# Patient Record
Sex: FEMALE | Race: NATIVE HAWAIIAN OR OTHER PACIFIC ISLANDER | Employment: FULL TIME | URBAN - METROPOLITAN AREA
[De-identification: names, ages, dates, MRNs, and addresses within clinical notes are randomized per-mention and may not be internally consistent; named-entity substitution may affect disease eponyms.]

---

## 2017-01-20 ENCOUNTER — HOSPITAL ENCOUNTER (OUTPATIENT)
Dept: ULTRASOUND IMAGING | Age: 32
Discharge: HOME | End: 2017-01-20
Attending: PHYSICIAN ASSISTANT | Admitting: PHYSICIAN ASSISTANT

## 2017-01-20 DIAGNOSIS — R10.11 RUQ PAIN: ICD-10-CM

## 2017-01-20 DIAGNOSIS — R10.11 RIGHT UPPER QUADRANT PAIN: ICD-10-CM

## 2017-01-24 ENCOUNTER — INITIAL CONSULT (OUTPATIENT)
Dept: SURGERY | Age: 32
End: 2017-01-24

## 2017-01-24 VITALS
SYSTOLIC BLOOD PRESSURE: 120 MMHG | HEIGHT: 63 IN | BODY MASS INDEX: 36.68 KG/M2 | WEIGHT: 207 LBS | DIASTOLIC BLOOD PRESSURE: 78 MMHG

## 2017-01-24 DIAGNOSIS — R22.9 SUBCUTANEOUS MASS: Primary | ICD-10-CM

## 2017-01-24 PROCEDURE — 99242 OFF/OP CONSLTJ NEW/EST SF 20: CPT | Performed by: SURGERY

## 2017-02-07 ENCOUNTER — OFFICE VISIT (OUTPATIENT)
Dept: SURGERY | Age: 32
End: 2017-02-07

## 2017-02-07 VITALS
HEIGHT: 64 IN | DIASTOLIC BLOOD PRESSURE: 76 MMHG | BODY MASS INDEX: 35.51 KG/M2 | SYSTOLIC BLOOD PRESSURE: 118 MMHG | WEIGHT: 208 LBS

## 2017-02-07 DIAGNOSIS — K80.20 GALLSTONES: Primary | ICD-10-CM

## 2017-02-07 PROCEDURE — 99214 OFFICE O/P EST MOD 30 MIN: CPT | Performed by: SURGERY

## 2017-02-16 ENCOUNTER — HOSPITAL ENCOUNTER (OUTPATIENT)
Dept: SURGERY | Age: 32
Discharge: HOME | End: 2017-02-16
Attending: SURGERY | Admitting: SURGERY

## 2017-02-16 VITALS
HEART RATE: 85 BPM | DIASTOLIC BLOOD PRESSURE: 80 MMHG | BODY MASS INDEX: 35.51 KG/M2 | RESPIRATION RATE: 16 BRPM | WEIGHT: 208 LBS | SYSTOLIC BLOOD PRESSURE: 126 MMHG | TEMPERATURE: 97.4 F | HEIGHT: 64 IN | OXYGEN SATURATION: 93 %

## 2017-02-16 DIAGNOSIS — K80.20 GALL STONES: ICD-10-CM

## 2017-02-16 LAB
AMYLASE: 88 U/L (ref 25–115)
ANION GAP SERPL CALCULATED.3IONS-SCNC: 11 MMOL/L (ref 3–16)
BUN BLDV-MCNC: 11 MG/DL (ref 7–20)
CALCIUM SERPL-MCNC: 9.1 MG/DL (ref 8.3–10.6)
CHLORIDE BLD-SCNC: 101 MMOL/L (ref 99–110)
CO2: 25 MMOL/L (ref 21–32)
CREAT SERPL-MCNC: 0.6 MG/DL (ref 0.6–1.1)
GFR AFRICAN AMERICAN: >60
GFR NON-AFRICAN AMERICAN: >60
GLUCOSE BLD-MCNC: 100 MG/DL (ref 70–99)
POTASSIUM SERPL-SCNC: 4 MMOL/L (ref 3.5–5.1)
PREGNANCY, URINE: NEGATIVE
SODIUM BLD-SCNC: 137 MMOL/L (ref 136–145)

## 2017-02-16 PROCEDURE — 47563 LAPARO CHOLECYSTECTOMY/GRAPH: CPT | Performed by: SURGERY

## 2017-02-16 RX ORDER — SODIUM CHLORIDE 0.9 % (FLUSH) 0.9 %
10 SYRINGE (ML) INJECTION EVERY 12 HOURS SCHEDULED
Status: DISCONTINUED | OUTPATIENT
Start: 2017-02-16 | End: 2017-02-17 | Stop reason: HOSPADM

## 2017-02-16 RX ORDER — MORPHINE SULFATE 4 MG/ML
2 INJECTION, SOLUTION INTRAMUSCULAR; INTRAVENOUS EVERY 5 MIN PRN
Status: DISCONTINUED | OUTPATIENT
Start: 2017-02-16 | End: 2017-02-17 | Stop reason: HOSPADM

## 2017-02-16 RX ORDER — DIPHENHYDRAMINE HYDROCHLORIDE 50 MG/ML
12.5 INJECTION INTRAMUSCULAR; INTRAVENOUS
Status: ACTIVE | OUTPATIENT
Start: 2017-02-16 | End: 2017-02-16

## 2017-02-16 RX ORDER — LIDOCAINE HYDROCHLORIDE 10 MG/ML
1 INJECTION, SOLUTION EPIDURAL; INFILTRATION; INTRACAUDAL; PERINEURAL
Status: COMPLETED | OUTPATIENT
Start: 2017-02-16 | End: 2017-02-16

## 2017-02-16 RX ORDER — OXYCODONE HYDROCHLORIDE 5 MG/1
5-10 TABLET ORAL EVERY 4 HOURS PRN
Qty: 40 TABLET | Refills: 0 | Status: SHIPPED | OUTPATIENT
Start: 2017-02-16 | End: 2017-03-02 | Stop reason: ALTCHOICE

## 2017-02-16 RX ORDER — MEPERIDINE HYDROCHLORIDE 25 MG/ML
12.5 INJECTION INTRAMUSCULAR; INTRAVENOUS; SUBCUTANEOUS EVERY 5 MIN PRN
Status: DISCONTINUED | OUTPATIENT
Start: 2017-02-16 | End: 2017-02-17 | Stop reason: HOSPADM

## 2017-02-16 RX ORDER — LABETALOL HYDROCHLORIDE 5 MG/ML
5 INJECTION, SOLUTION INTRAVENOUS EVERY 10 MIN PRN
Status: DISCONTINUED | OUTPATIENT
Start: 2017-02-16 | End: 2017-02-17 | Stop reason: HOSPADM

## 2017-02-16 RX ORDER — ONDANSETRON 2 MG/ML
4 INJECTION INTRAMUSCULAR; INTRAVENOUS
Status: COMPLETED | OUTPATIENT
Start: 2017-02-16 | End: 2017-02-16

## 2017-02-16 RX ORDER — MORPHINE SULFATE 4 MG/ML
1 INJECTION, SOLUTION INTRAMUSCULAR; INTRAVENOUS EVERY 5 MIN PRN
Status: DISCONTINUED | OUTPATIENT
Start: 2017-02-16 | End: 2017-02-17 | Stop reason: HOSPADM

## 2017-02-16 RX ORDER — HYDROMORPHONE HCL 110MG/55ML
0.25 PATIENT CONTROLLED ANALGESIA SYRINGE INTRAVENOUS EVERY 5 MIN PRN
Status: DISCONTINUED | OUTPATIENT
Start: 2017-02-16 | End: 2017-02-17 | Stop reason: HOSPADM

## 2017-02-16 RX ORDER — HYDRALAZINE HYDROCHLORIDE 20 MG/ML
5 INJECTION INTRAMUSCULAR; INTRAVENOUS
Status: DISCONTINUED | OUTPATIENT
Start: 2017-02-16 | End: 2017-02-17 | Stop reason: HOSPADM

## 2017-02-16 RX ORDER — HEPARIN SODIUM 5000 [USP'U]/ML
5000 INJECTION, SOLUTION INTRAVENOUS; SUBCUTANEOUS ONCE
Status: COMPLETED | OUTPATIENT
Start: 2017-02-16 | End: 2017-02-16

## 2017-02-16 RX ORDER — HYDROMORPHONE HCL 110MG/55ML
0.5 PATIENT CONTROLLED ANALGESIA SYRINGE INTRAVENOUS EVERY 5 MIN PRN
Status: DISCONTINUED | OUTPATIENT
Start: 2017-02-16 | End: 2017-02-17 | Stop reason: HOSPADM

## 2017-02-16 RX ORDER — SODIUM CHLORIDE 0.9 % (FLUSH) 0.9 %
10 SYRINGE (ML) INJECTION PRN
Status: DISCONTINUED | OUTPATIENT
Start: 2017-02-16 | End: 2017-02-17 | Stop reason: HOSPADM

## 2017-02-16 RX ORDER — SODIUM CHLORIDE, SODIUM LACTATE, POTASSIUM CHLORIDE, CALCIUM CHLORIDE 600; 310; 30; 20 MG/100ML; MG/100ML; MG/100ML; MG/100ML
INJECTION, SOLUTION INTRAVENOUS CONTINUOUS
Status: DISCONTINUED | OUTPATIENT
Start: 2017-02-16 | End: 2017-02-17 | Stop reason: HOSPADM

## 2017-02-16 RX ORDER — OXYCODONE HYDROCHLORIDE AND ACETAMINOPHEN 5; 325 MG/1; MG/1
2 TABLET ORAL PRN
Status: COMPLETED | OUTPATIENT
Start: 2017-02-16 | End: 2017-02-16

## 2017-02-16 RX ORDER — OXYCODONE HYDROCHLORIDE AND ACETAMINOPHEN 5; 325 MG/1; MG/1
1 TABLET ORAL PRN
Status: COMPLETED | OUTPATIENT
Start: 2017-02-16 | End: 2017-02-16

## 2017-02-16 RX ADMIN — HEPARIN SODIUM 5000 UNITS: 5000 INJECTION, SOLUTION INTRAVENOUS; SUBCUTANEOUS at 11:20

## 2017-02-16 RX ADMIN — SODIUM CHLORIDE, SODIUM LACTATE, POTASSIUM CHLORIDE, CALCIUM CHLORIDE: 600; 310; 30; 20 INJECTION, SOLUTION INTRAVENOUS at 11:21

## 2017-02-16 RX ADMIN — Medication 0.5 MG: at 13:06

## 2017-02-16 RX ADMIN — Medication 0.5 MG: at 12:55

## 2017-02-16 RX ADMIN — LIDOCAINE HYDROCHLORIDE 1 ML: 10 INJECTION, SOLUTION EPIDURAL; INFILTRATION; INTRACAUDAL; PERINEURAL at 11:24

## 2017-02-16 RX ADMIN — ONDANSETRON 4 MG: 2 INJECTION INTRAMUSCULAR; INTRAVENOUS at 12:55

## 2017-02-16 RX ADMIN — OXYCODONE HYDROCHLORIDE AND ACETAMINOPHEN 1 TABLET: 5; 325 TABLET ORAL at 13:32

## 2017-02-16 RX ADMIN — Medication 0.5 MG: at 13:15

## 2017-02-16 ASSESSMENT — PAIN SCALES - GENERAL
PAINLEVEL_OUTOF10: 10
PAINLEVEL_OUTOF10: 4
PAINLEVEL_OUTOF10: 8
PAINLEVEL_OUTOF10: 7

## 2017-02-22 ENCOUNTER — TELEPHONE (OUTPATIENT)
Dept: SURGERY | Age: 32
End: 2017-02-22

## 2017-03-02 ENCOUNTER — OFFICE VISIT (OUTPATIENT)
Dept: SURGERY | Age: 32
End: 2017-03-02

## 2017-03-02 VITALS
DIASTOLIC BLOOD PRESSURE: 78 MMHG | WEIGHT: 203 LBS | BODY MASS INDEX: 34.66 KG/M2 | HEIGHT: 64 IN | SYSTOLIC BLOOD PRESSURE: 124 MMHG

## 2017-03-02 DIAGNOSIS — Z98.890 POST-OPERATIVE STATE: Primary | ICD-10-CM

## 2017-03-02 PROCEDURE — 99024 POSTOP FOLLOW-UP VISIT: CPT | Performed by: SURGERY

## 2017-10-26 ENCOUNTER — OFFICE VISIT (OUTPATIENT)
Dept: CARDIOLOGY CLINIC | Age: 32
End: 2017-10-26

## 2017-10-26 VITALS
DIASTOLIC BLOOD PRESSURE: 70 MMHG | HEART RATE: 88 BPM | HEIGHT: 63 IN | WEIGHT: 185 LBS | SYSTOLIC BLOOD PRESSURE: 120 MMHG | BODY MASS INDEX: 32.78 KG/M2

## 2017-10-26 DIAGNOSIS — R00.2 PALPITATION: ICD-10-CM

## 2017-10-26 DIAGNOSIS — R07.89 ATYPICAL CHEST PAIN: Primary | ICD-10-CM

## 2017-10-26 DIAGNOSIS — R06.02 SOB (SHORTNESS OF BREATH): ICD-10-CM

## 2017-10-26 PROBLEM — R07.2 PRECORDIAL PAIN: Status: ACTIVE | Noted: 2017-10-26

## 2017-10-26 PROCEDURE — 99204 OFFICE O/P NEW MOD 45 MIN: CPT | Performed by: INTERNAL MEDICINE

## 2017-10-26 NOTE — COMMUNICATION BODY
Assessment:  1. Atypical chest pain      2. SOB (shortness of breath)      3. Palpitation         Recommendation:  - She has low pretest probability for CAD and will risk stratify her with stress echocardiogram. For her shortness of breath will obtain echocardiogram to rule out structural heart disease. Her palpitations may be related to anxiety vs tachyarrhythmias and will obtain 30 day event monitor.  - She will follow up based on test results.

## 2017-11-08 ENCOUNTER — HOSPITAL ENCOUNTER (OUTPATIENT)
Dept: CARDIOLOGY | Facility: CLINIC | Age: 32
Discharge: HOME | End: 2017-11-08
Attending: INTERNAL MEDICINE | Admitting: INTERNAL MEDICINE

## 2017-11-13 ENCOUNTER — TELEPHONE (OUTPATIENT)
Dept: CARDIOLOGY CLINIC | Age: 32
End: 2017-11-13

## 2017-11-13 NOTE — TELEPHONE ENCOUNTER
Spoke with April, relayed echo and stress echo results per Dr. Aidee Kam. Pt verbalized understanding.

## 2017-12-12 ENCOUNTER — TELEPHONE (OUTPATIENT)
Dept: CARDIOLOGY CLINIC | Age: 32
End: 2017-12-12

## 2017-12-13 PROCEDURE — 93272 ECG/REVIEW INTERPRET ONLY: CPT | Performed by: INTERNAL MEDICINE

## 2017-12-14 DIAGNOSIS — R07.89 ATYPICAL CHEST PAIN: ICD-10-CM

## 2017-12-14 DIAGNOSIS — R00.2 PALPITATION: ICD-10-CM

## 2017-12-14 DIAGNOSIS — R06.02 SOB (SHORTNESS OF BREATH): ICD-10-CM

## 2017-12-14 NOTE — TELEPHONE ENCOUNTER
Left message to call for monitor results. Abnormal.  He would like to know if patient is willing to start a beta blocker. Report scanned in.

## 2017-12-19 ENCOUNTER — TELEPHONE (OUTPATIENT)
Dept: CARDIOLOGY CLINIC | Age: 32
End: 2017-12-19

## 2017-12-19 NOTE — TELEPHONE ENCOUNTER
Per pt HIPA form can leave detailed message on machine. [facility] relaying [facility] message. Pt to call the office if she has concerns.

## 2017-12-22 ENCOUNTER — TELEPHONE (OUTPATIENT)
Dept: CARDIOLOGY CLINIC | Age: 32
End: 2017-12-22

## 2017-12-27 ENCOUNTER — TELEPHONE (OUTPATIENT)
Dept: CARDIOLOGY CLINIC | Age: 32
End: 2017-12-27

## 2017-12-27 NOTE — TELEPHONE ENCOUNTER
Pt sts her HR is low running low 70's and her B/P is 142/94. Every now and then getting pressure in her head, feels nauseus and pressure in chest. This started this AM. Please advise. Last OV 11/8/2017   labs 10/25/2017   echo 11/08/2017   See order for event monitor from 12/14/2017,but order still active.

## 2017-12-28 ENCOUNTER — OFFICE VISIT (OUTPATIENT)
Dept: CARDIOLOGY CLINIC | Age: 32
End: 2017-12-28

## 2017-12-28 ENCOUNTER — HOSPITAL ENCOUNTER (OUTPATIENT)
Dept: OTHER | Age: 32
Discharge: HOME | End: 2017-12-28
Attending: EMERGENCY MEDICINE | Admitting: EMERGENCY MEDICINE

## 2017-12-28 VITALS
WEIGHT: 174 LBS | SYSTOLIC BLOOD PRESSURE: 98 MMHG | OXYGEN SATURATION: 98 % | DIASTOLIC BLOOD PRESSURE: 60 MMHG | BODY MASS INDEX: 30.83 KG/M2 | HEART RATE: 71 BPM | HEIGHT: 63 IN

## 2017-12-28 DIAGNOSIS — I49.3 SYMPTOMATIC PVCS: Primary | ICD-10-CM

## 2017-12-28 DIAGNOSIS — I49.1 PAC (PREMATURE ATRIAL CONTRACTION): ICD-10-CM

## 2017-12-28 DIAGNOSIS — R00.0 SINUS TACHYCARDIA: ICD-10-CM

## 2017-12-28 PROCEDURE — 99213 OFFICE O/P EST LOW 20 MIN: CPT | Performed by: INTERNAL MEDICINE

## 2017-12-28 RX ORDER — MAGNESIUM OXIDE 400 MG/1
400 TABLET ORAL DAILY
Qty: 30 TABLET | Refills: 1 | Status: SHIPPED | OUTPATIENT
Start: 2017-12-28 | End: 2018-02-12 | Stop reason: ALTCHOICE

## 2017-12-28 NOTE — PATIENT INSTRUCTIONS
Recommendation:  - She was started on Lopressor for the ectopy noted on event monitor. Her ectopy and tachycardia is better on the Lopressor. She is taking half the dose as the full 25 mg dose was making her dizzy. I will also add Mg oxide 400 mg daily for the PVCs. Her stress echo was normal and echocardiogram showed no structural heart disease. - I will see her back in 12 months.

## 2017-12-28 NOTE — LETTER
[address] [place] [address] Cardiology - [address] [place] [id number]  Phone: [phone]  Fax: [phone]    Susan Carrasco MD        December 28, 2017     Frantz Rodríguez NP  [address] [facility] [address]    Patient: Essence Jackson  MR Number: [id number]  YOB: 1985  Date of Visit: 12/28/2017    Dear Dr. Frantz Rodríguez: Thank you for allowing me to participate in the care of Essence Roca. Below are the relevant portions of my assessment and plan of care. Assessment:  1. Symptomatic PACs & PVCs     2. Symptomatic sinus tachcyardia     3. Palpitation         Recommendation:  - She was started on Lopressor for the ectopy noted on event monitor. Her ectopy and tachycardia is better on the Lopressor. She is taking half the dose as the full 25 mg dose was making her dizzy. I will also add Mg oxide 400 mg daily for the PVCs. Her stress echo was normal and echocardiogram showed no structural heart disease. - I will see her back in 12 months. If you have questions, please do not hesitate to call me. I look forward to following April along with you.     Sincerely,        Susan Carrasco MD

## 2017-12-28 NOTE — PROGRESS NOTES
Section of Cardiology                                     Cardiovascular Evaluation      PATIENT: Essence Riggs  DATE: 2017  MRN: [id number]  CSN: [id number]  : 1985    Primary Care Doctor: Osiel Moctezuma NP    Reason for evaluation:   Chest Pain (pressure, sob, pressure in head, tingling face for several months)    History of present illness:  Essence Ruiz is a 34 y.o. patient who presents with palpitations, chest pain and shortness of breath. She reports her chest pain is in the center with radiation across her chest. She has radiation into her left shoulder and axillary. She reports \"her heart fells like its collapsing on her. \" She reports the episodes are with and without activity. She started on antianxiety meds that have not really helped. She reports the pain lasts up to 2 hours. If the pain is more than 2 hours, she goes to the ER. She reports she has episodes of shortness of breath with the chest pain. She reports she has \"racing heart beat\" at onset of chest pain. She reports her Fit Bit showed her heart rate in the 130s. She reports the palpitations are increasing in frequency. Occasionally she has the palpitations unrelated to chest pain. She has increased stress at her job. Today she presents for follow up of palpitations, chest pain SOB. Since last visit, she wore a cardiac event monitor 10/26-17 which showed PVC's, PAC and ST. She underwent stress test 17 which was normal. She was started on Lopressor. This has helped with her palpitations. Denied chest pain, shortness of breath, edema, dizziness and syncope. She presented to the ED last night for left leg pain. Doppler today was negative for DVT. Past Medical History:   has a past medical history of Anxiety; Asthma; Ectopic pregnancy; and Heart abnormalities.     Surgical History:   has a past surgical history that includes [place] tooth extraction; Colonoscopy (14); and Cholecystectomy normal       Neck: Supple, symmetrical, trachea midline, no adenopathy, thyroid: not enlarged, symmetric, no tenderness/mass/nodules, no carotid bruit or JVD       Lungs:   Clear to auscultation bilaterally, respirations unlabored   Chest Wall:  No tenderness or deformity       Heart:  Regular rhythm and normal rate; S1, S2 are normal; no murmur noted; no rub or gallop       Abdomen:   Soft, non-tender, bowel sounds active all four quadrants,  no masses, no organomegaly       Extremities: No cyanosis or edema. No deformity of hands or feet   Pulses: 2+ and symmetric       Skin: Skin color, texture, turgor normal. No rashes or lesions       Pysch: Normal mood and affect.  Alert, oriented x 3       Neurologic: Normal gross motor and sensory exam.       DIAGNOSTIC WORK UP:  Lab Data:  CBC:   Lab Results   Component Value Date    WBC 12.1 12/27/2017    HGB 14.0 12/27/2017    HCT 41.4 12/27/2017    MCV 90.2 12/27/2017     12/27/2017     BMP:   Lab Results   Component Value Date     12/27/2017    K 3.5 12/27/2017     12/27/2017    CO2 22 12/27/2017    PHOS 3.1 02/28/2012    BUN 11 12/27/2017    CREATININE <0.5 12/27/2017    CALCIUM 9.5 12/27/2017    GFRAA >60 12/27/2017    GFRAA >60 02/28/2012    MG 1.8 02/28/2012     LFTS:   Lab Results   Component Value Date    ALT 15 12/27/2017    AST 11 12/27/2017    ALKPHOS 66 12/27/2017    PROT 6.7 12/27/2017    PROT 6.7 01/26/2012    AGRATIO 1.5 12/27/2017    BILITOT <0.2 12/27/2017     PT/INR: No results found for: PTINR  LIPID PANEL: No components found for: CHLPL  No results found for: TRIG  No results found for: HDL  No results found for: LDLCALC  TSH:   Lab Results   Component Value Date    TSH 3.13 06/07/2017     FREET4: No results found for: Hiral Edelson: No components found for: FREET3  BNP: No results found for: BNP    Stress/Echo 11/8/17  Echo   Baseline resting echocardiogram shows normal global LV systolic function   with an ejection fraction of 55%

## 2018-01-09 ENCOUNTER — HOSPITAL ENCOUNTER (OUTPATIENT)
Dept: NEUROLOGY | Age: 33
Discharge: HOME | End: 2018-01-09
Attending: NURSE PRACTITIONER | Admitting: NURSE PRACTITIONER

## 2018-01-09 NOTE — PROCEDURES
and Nerve Conduction Findings: The above EMG and nerve conduction studies were within normal limits. Overall Impression: Normal study without evidence of an acute radiculopathy, entrapment neuropathy or other lower motor neuron dysfunction. Thank you    Electronically signed by:  Jennifer Yates DO,1/9/2018,2:56 PM

## 2018-01-24 ENCOUNTER — OFFICE VISIT (OUTPATIENT)
Dept: ORTHOPEDIC SURGERY | Age: 33
End: 2018-01-24

## 2018-01-24 VITALS
HEIGHT: 63 IN | HEART RATE: 81 BPM | SYSTOLIC BLOOD PRESSURE: 117 MMHG | WEIGHT: 173.94 LBS | BODY MASS INDEX: 30.82 KG/M2 | DIASTOLIC BLOOD PRESSURE: 81 MMHG

## 2018-01-24 DIAGNOSIS — M54.12 CERVICAL RADICULITIS: Primary | ICD-10-CM

## 2018-01-24 DIAGNOSIS — M50.30 DDD (DEGENERATIVE DISC DISEASE), CERVICAL: ICD-10-CM

## 2018-01-24 PROCEDURE — 99204 OFFICE O/P NEW MOD 45 MIN: CPT | Performed by: PHYSICIAN ASSISTANT

## 2018-01-24 NOTE — PROGRESS NOTES
Denies skin changes, delayed healing, rash, itching   HEMATOLOGIC: Denies easy bleeding or bruising  ENDOCRINE: Denies excessive thirst, urination, heat/cold  RESPIRATORY: Denies current dyspnea, cough  GI: Denies nausea, vomiting, diarrhea   : Denies bowel or bladder issues       PHYSICAL EXAM:    Vitals: Blood pressure 117/81, pulse 81, height 5' 2.99\" (1.6 m), weight 173 lb 15.1 oz (78.9 kg), last menstrual period 12/20/2017, not currently breastfeeding. GENERAL EXAM:  · General Apparence: Patient is adequately groomed with no evidence of malnutrition. · Orientation: The patient is oriented to time, place and person. · Mood & Affect:The patient's mood and affect are appropriate   · Vascular: Examination reveals no swelling tenderness in upper or lower extremities. Good capillary refill  · Lymphatic: The lymphatic examination bilaterally reveals all areas to be without enlargement or induration  · Sensation: Sensation is intact without deficit  · Coordination/Balance: Good coordination     CERVICAL EXAMINATION:  · Inspection: Local inspection shows no step-off or bruising. Cervical alignment is normal.     · Palpation: No evidence of tenderness at the midline. Mild tenderness left trap. There is no step-off or paraspinal spasm. · Range of Motion: Intact flexion mild loss of extension  · Strength: 5/5 bilateral upper extremities   · Special Tests:    ·   Spurling's causes some pain extending into the left trap, no distal pain, L'Hermitte's & Huang's negative bilaterally. ·   Grier and Impingement tests are negative bilaterally. ·  Cubital and Carpal tunnel Tinel's negative bilaterally. · Skin:There are no rashes, ulcerations or lesions in right & left upper extremities. · Reflexes: Bilaterally triceps, biceps and brachioradialis are 2+. Clonus absent bilaterally at the feet.     · Additional Examinations:       · RIGHT UPPER EXTREMITY:  Inspection/examination of the right upper extremity

## 2018-02-22 ENCOUNTER — OFFICE VISIT (OUTPATIENT)
Dept: ORTHOPEDIC SURGERY | Age: 33
End: 2018-02-22

## 2018-02-22 VITALS
HEIGHT: 63 IN | BODY MASS INDEX: 30.47 KG/M2 | DIASTOLIC BLOOD PRESSURE: 91 MMHG | WEIGHT: 171.96 LBS | SYSTOLIC BLOOD PRESSURE: 130 MMHG | HEART RATE: 86 BPM

## 2018-02-22 DIAGNOSIS — M54.12 CERVICAL RADICULITIS: Primary | ICD-10-CM

## 2018-02-22 DIAGNOSIS — M50.30 DDD (DEGENERATIVE DISC DISEASE), CERVICAL: ICD-10-CM

## 2018-02-22 PROCEDURE — 99214 OFFICE O/P EST MOD 30 MIN: CPT | Performed by: PHYSICIAN ASSISTANT

## 2018-02-22 NOTE — PROGRESS NOTES
Follow up: SP[facility]    CHIEF COMPLAINT:    Chief Complaint   Patient presents with    Neck Pain     cervical       HISTORY OF PRESENT ILLNESS:                The patient is a 34 y.o. female here to follow up for cervical MRI review for a 7 month history of aching neck pain with intermittent numbness extending into the left shoulder (no longer below). Symptoms are increased with any prolonged cervical ROM and activity. Some relief with stretching. History mild numbness below her left orbit she is pending neurology consult. Previous negative cardiac evaluation. Conservative care includes chiropractics, NSAIDs. Pending physical therapy. She denies any upper extremity weakness, no fine motor difficulty or gait instability. No diplopia or dysphasia. No lower extremity symptoms. No bowel or bladder dysfunction. Current/Past Treatment:   · Physical Therapy: HEP  · Chiropractic:   YES  · Injection:   no  · Medications: NSAIDs   · Surgery/Consult: no     Work Status/Functionality: IT      Past Medical History: Medical history form was reviewed today and scanned into the media tab. Past Medical History:   Diagnosis Date    Anxiety     Asthma     Ectopic pregnancy     Heart abnormalities     heart palpitations in early pregnancy        REVIEW OF SYSTEMS:   CONSTITUTIONAL: Denies unexplained weight loss, fevers, chills or fatigue  NEUROLOGIC: Denies tremors or seizures         PHYSICAL EXAM:    Vitals: Blood pressure (!) 130/91, pulse 86, height 5' 2.99\" (1.6 m), weight 171 lb 15.3 oz (78 kg), last menstrual period 02/08/2018, not currently breastfeeding. GENERAL EXAM:  · General Apparence: Patient is adequately groomed with no evidence of malnutrition. · Orientation: The patient is oriented to time, place and person.    · Mood & Affect:The patient's mood and affect are appropriate    · Lymphatic: The lymphatic examination bilaterally reveals all areas to be without enlargement or induration  · Sensation:

## 2018-02-28 ENCOUNTER — HOSPITAL ENCOUNTER (OUTPATIENT)
Dept: PHYSICAL THERAPY | Age: 33
Discharge: HOME | End: 2018-02-28
Admitting: ORTHOPAEDIC SURGERY

## 2018-03-01 ENCOUNTER — HOSPITAL ENCOUNTER (OUTPATIENT)
Dept: PHYSICAL THERAPY | Age: 33
Discharge: HOME | End: 2018-03-31
Attending: ORTHOPAEDIC SURGERY | Admitting: ORTHOPAEDIC SURGERY

## 2018-06-18 ENCOUNTER — HOSPITAL ENCOUNTER (OUTPATIENT)
Dept: OTHER | Age: 33
Discharge: HOME | End: 2018-06-18
Attending: INTERNAL MEDICINE | Admitting: INTERNAL MEDICINE

## 2018-06-19 LAB
T3 FREE: 2.5 PG/ML (ref 2.3–4.2)
T4 FREE: 1.2 NG/DL (ref 0.9–1.8)
TSH SERPL DL<=0.05 MIU/L-ACNC: 1.9 UIU/ML (ref 0.27–4.2)

## 2018-09-27 ENCOUNTER — HOSPITAL ENCOUNTER (EMERGENCY)
Age: 33
Discharge: HOME | End: 2018-09-27
Payer: COMMERCIAL

## 2018-09-27 VITALS
BODY MASS INDEX: 28.35 KG/M2 | TEMPERATURE: 98.3 F | HEIGHT: 63 IN | WEIGHT: 160 LBS | OXYGEN SATURATION: 100 % | HEART RATE: 88 BPM | SYSTOLIC BLOOD PRESSURE: 128 MMHG | DIASTOLIC BLOOD PRESSURE: 78 MMHG | RESPIRATION RATE: 14 BRPM

## 2018-09-27 DIAGNOSIS — B09 VIRAL EXANTHEM: Primary | ICD-10-CM

## 2018-09-27 LAB — S PYO AG THROAT QL: NEGATIVE

## 2018-09-27 PROCEDURE — 87880 STREP A ASSAY W/OPTIC: CPT

## 2018-09-27 PROCEDURE — 6370000000 HC RX 637 (ALT 250 FOR IP): Performed by: PHYSICIAN ASSISTANT

## 2018-09-27 PROCEDURE — 99282 EMERGENCY DEPT VISIT SF MDM: CPT

## 2018-09-27 PROCEDURE — 87081 CULTURE SCREEN ONLY: CPT

## 2018-09-27 RX ORDER — PREDNISONE 20 MG/1
60 TABLET ORAL ONCE
Status: COMPLETED | OUTPATIENT
Start: 2018-09-27 | End: 2018-09-27

## 2018-09-27 RX ORDER — PREDNISONE 10 MG/1
60 TABLET ORAL DAILY
Qty: 30 TABLET | Refills: 0 | Status: SHIPPED | OUTPATIENT
Start: 2018-09-27 | End: 2018-10-02

## 2018-09-27 RX ORDER — DIPHENHYDRAMINE HCL 25 MG
25 CAPSULE ORAL EVERY 4 HOURS PRN
Qty: 25 CAPSULE | Refills: 0 | Status: SHIPPED | OUTPATIENT
Start: 2018-09-27 | End: 2018-10-07

## 2018-09-27 RX ORDER — DIPHENHYDRAMINE HCL 25 MG
25 TABLET ORAL ONCE
Status: COMPLETED | OUTPATIENT
Start: 2018-09-27 | End: 2018-09-27

## 2018-09-27 RX ADMIN — PREDNISONE 60 MG: 20 TABLET ORAL at 10:59

## 2018-09-27 RX ADMIN — DIPHENHYDRAMINE HCL 25 MG: 25 TABLET ORAL at 10:59

## 2018-09-27 ASSESSMENT — PAIN DESCRIPTION - LOCATION: LOCATION: GENERALIZED

## 2018-09-27 ASSESSMENT — PAIN DESCRIPTION - PAIN TYPE: TYPE: ACUTE PAIN

## 2018-09-27 ASSESSMENT — PAIN SCALES - GENERAL: PAINLEVEL_OUTOF10: 2

## 2018-09-27 ASSESSMENT — PAIN DESCRIPTION - DESCRIPTORS: DESCRIPTORS: ACHING

## 2018-09-27 NOTE — ED PROVIDER NOTES
[facility]  e[facility]GENCY dEPARTMENT eNCOUnter      Pt Name: April Ronni Schirmer  MRN: [id number]  Ramirogfalejandra 1985  Date of evaluation: 9/27/2018  Provider: KHLOE Mendoza      Chief Complaint:    Chief Complaint   Patient presents with    Rash     scattered rash all over that started yesterday. Pt denies itching but states \"it feels like it burns some times, like really dry skin\". Unknown cause, denies new medications/soaps/lotion       Nursing Notes, Past Medical Hx, Past Surgical Hx, Social Hx, Allergies, and Family Hx were all reviewed and agreed with or any disagreements were addressed in the HPI.    HPI:  (Location, Duration, Timing, Severity, Quality, Assoc Sx, Context, Modifying factors)  This is a  34 y.o. female who presents here to the emergency department, she states that she began getting a rash on her arms and legs last night. She also states that she has some mild body aching in her hands and feet were a little swollen this morning. She denies any vaginal discharge, increased frequency or urgency of urination, fevers or chills, chest pain or shortness of breath. She did buy some new clothes yesterday, but otherwise has no new detergents, lotions or soaps or new medicines or new products. She denies any exposure to strep throat or sore throat at this time. She rates her discomfort level is 2/10.     Past Medical/Surgical History:      Diagnosis Date    Anxiety     Asthma     Ectopic pregnancy     Heart abnormalities     heart palpitations in early pregnancy         Procedure Laterality Date    CHOLECYSTECTOMY  02/16/2017    LAPAROSCOPIC CHOLECYSTECTOMY WITH CHOLANGIOGRAMS    COLONOSCOPY  5/19/14    normal    WISDOM TOOTH EXTRACTION      3 yrs ago       Medications:  Previous Medications    METOPROLOL TARTRATE (LOPRESSOR) 25 MG TABLET    Take 0.5 tablets by mouth 2 times daily         Review of Systems:  Review of

## 2018-10-02 LAB — S PYO THROAT QL CULT: NORMAL

## 2018-10-10 ENCOUNTER — APPOINTMENT (OUTPATIENT)
Dept: GENERAL RADIOLOGY | Age: 33
End: 2018-10-10
Payer: COMMERCIAL

## 2018-10-10 ENCOUNTER — HOSPITAL ENCOUNTER (EMERGENCY)
Age: 33
Discharge: HOME | End: 2018-10-11
Payer: COMMERCIAL

## 2018-10-10 DIAGNOSIS — M54.2 NECK PAIN: Primary | ICD-10-CM

## 2018-10-10 DIAGNOSIS — M79.601 PAIN OF RIGHT UPPER EXTREMITY: ICD-10-CM

## 2018-10-10 LAB
A/G RATIO: 1.5 (ref 1.1–2.2)
ALBUMIN SERPL-MCNC: 3.9 G/DL (ref 3.4–5)
ALP BLD-CCNC: 52 U/L (ref 40–129)
ALT SERPL-CCNC: 24 U/L (ref 10–40)
ANION GAP SERPL CALCULATED.3IONS-SCNC: 11 MMOL/L (ref 3–16)
AST SERPL-CCNC: 13 U/L (ref 15–37)
BILIRUB SERPL-MCNC: <0.2 MG/DL (ref 0–1)
BILIRUBIN URINE: NEGATIVE
BLOOD, URINE: NEGATIVE
BUN BLDV-MCNC: 18 MG/DL (ref 7–20)
CALCIUM SERPL-MCNC: 9.2 MG/DL (ref 8.3–10.6)
CHLORIDE BLD-SCNC: 104 MMOL/L (ref 99–110)
CLARITY: CLEAR
CO2: 23 MMOL/L (ref 21–32)
COLOR: YELLOW
CREAT SERPL-MCNC: 0.7 MG/DL (ref 0.6–1.1)
GFR AFRICAN AMERICAN: >60
GFR NON-AFRICAN AMERICAN: >60
GLOBULIN: 2.6 G/DL
GLUCOSE BLD-MCNC: 106 MG/DL (ref 70–99)
GLUCOSE URINE: NEGATIVE MG/DL
HCG(URINE) PREGNANCY TEST: NEGATIVE
HCT VFR BLD CALC: 42.4 % (ref 36–48)
HEMOGLOBIN: 14.4 G/DL (ref 12–16)
KETONES, URINE: NEGATIVE MG/DL
LEUKOCYTE ESTERASE, URINE: NEGATIVE
MCH RBC QN AUTO: 31.7 PG (ref 26–34)
MCHC RBC AUTO-ENTMCNC: 34 G/DL (ref 31–36)
MCV RBC AUTO: 93.3 FL (ref 80–100)
MICROSCOPIC EXAMINATION: NORMAL
NITRITE, URINE: NEGATIVE
PDW BLD-RTO: 14 % (ref 12.4–15.4)
PH UA: 6
PLATELET # BLD: 409 K/UL (ref 135–450)
PMV BLD AUTO: 6.2 FL (ref 5–10.5)
POTASSIUM SERPL-SCNC: 4.3 MMOL/L (ref 3.5–5.1)
PROTEIN UA: NEGATIVE MG/DL
RBC # BLD: 4.55 M/UL (ref 4–5.2)
SODIUM BLD-SCNC: 138 MMOL/L (ref 136–145)
SPECIFIC GRAVITY UA: 1.02
TOTAL PROTEIN: 6.5 G/DL (ref 6.4–8.2)
TROPONIN: <0.01 NG/ML
URINE TYPE: NORMAL
UROBILINOGEN, URINE: 0.2 E.U./DL
WBC # BLD: 10.1 K/UL (ref 4–11)

## 2018-10-10 PROCEDURE — 84484 ASSAY OF TROPONIN QUANT: CPT

## 2018-10-10 PROCEDURE — 96374 THER/PROPH/DIAG INJ IV PUSH: CPT

## 2018-10-10 PROCEDURE — 81003 URINALYSIS AUTO W/O SCOPE: CPT

## 2018-10-10 PROCEDURE — 84703 CHORIONIC GONADOTROPIN ASSAY: CPT

## 2018-10-10 PROCEDURE — 80053 COMPREHEN METABOLIC PANEL: CPT

## 2018-10-10 PROCEDURE — 93005 ELECTROCARDIOGRAM TRACING: CPT | Performed by: EMERGENCY MEDICINE

## 2018-10-10 PROCEDURE — 85027 COMPLETE CBC AUTOMATED: CPT

## 2018-10-10 PROCEDURE — 6360000002 HC RX W HCPCS: Performed by: NURSE PRACTITIONER

## 2018-10-10 PROCEDURE — 99283 EMERGENCY DEPT VISIT LOW MDM: CPT

## 2018-10-10 PROCEDURE — 71046 X-RAY EXAM CHEST 2 VIEWS: CPT

## 2018-10-10 RX ORDER — KETOROLAC TROMETHAMINE 30 MG/ML
30 INJECTION, SOLUTION INTRAMUSCULAR; INTRAVENOUS ONCE
Status: COMPLETED | OUTPATIENT
Start: 2018-10-10 | End: 2018-10-10

## 2018-10-10 RX ADMIN — KETOROLAC TROMETHAMINE 30 MG: 30 INJECTION, SOLUTION INTRAMUSCULAR at 22:56

## 2018-10-10 ASSESSMENT — PAIN SCALES - GENERAL
PAINLEVEL_OUTOF10: 2
PAINLEVEL_OUTOF10: 2

## 2018-10-11 VITALS
WEIGHT: 169 LBS | BODY MASS INDEX: 29.95 KG/M2 | HEIGHT: 63 IN | HEART RATE: 82 BPM | DIASTOLIC BLOOD PRESSURE: 82 MMHG | RESPIRATION RATE: 16 BRPM | TEMPERATURE: 98.2 F | OXYGEN SATURATION: 98 % | SYSTOLIC BLOOD PRESSURE: 125 MMHG

## 2018-10-11 LAB
EKG ATRIAL RATE: 85 BPM
EKG DIAGNOSIS: NORMAL
EKG P AXIS: 48 DEGREES
EKG P-R INTERVAL: 140 MS
EKG Q-T INTERVAL: 364 MS
EKG QRS DURATION: 86 MS
EKG QTC CALCULATION (BAZETT): 433 MS
EKG R AXIS: 59 DEGREES
EKG T AXIS: 41 DEGREES
EKG VENTRICULAR RATE: 85 BPM

## 2018-10-11 PROCEDURE — 93010 ELECTROCARDIOGRAM REPORT: CPT | Performed by: INTERNAL MEDICINE

## 2018-10-11 RX ORDER — METHOCARBAMOL 750 MG/1
750 TABLET, FILM COATED ORAL 4 TIMES DAILY
Qty: 40 TABLET | Refills: 0 | Status: SHIPPED | OUTPATIENT
Start: 2018-10-11 | End: 2018-10-21

## 2018-10-11 RX ORDER — NAPROXEN 500 MG/1
500 TABLET ORAL 2 TIMES DAILY
Qty: 30 TABLET | Refills: 0 | Status: SHIPPED | OUTPATIENT
Start: 2018-10-11 | End: 2019-03-07 | Stop reason: ALTCHOICE

## 2019-02-11 ENCOUNTER — TELEPHONE (OUTPATIENT)
Dept: CARDIOLOGY CLINIC | Age: 34
End: 2019-02-11

## 2019-03-07 ENCOUNTER — OFFICE VISIT (OUTPATIENT)
Dept: SURGERY | Age: 34
End: 2019-03-07
Payer: COMMERCIAL

## 2019-03-07 VITALS
HEIGHT: 63 IN | WEIGHT: 174 LBS | SYSTOLIC BLOOD PRESSURE: 118 MMHG | DIASTOLIC BLOOD PRESSURE: 68 MMHG | BODY MASS INDEX: 30.83 KG/M2

## 2019-03-07 DIAGNOSIS — D17.21 LIPOMA OF RIGHT UPPER EXTREMITY: Primary | ICD-10-CM

## 2019-03-07 PROCEDURE — 99213 OFFICE O/P EST LOW 20 MIN: CPT | Performed by: SURGERY

## 2019-03-11 ENCOUNTER — ANESTHESIA EVENT (OUTPATIENT)
Dept: OPERATING ROOM | Age: 34
End: 2019-03-11
Payer: COMMERCIAL

## 2019-03-12 ENCOUNTER — HOSPITAL ENCOUNTER (OUTPATIENT)
Age: 34
Setting detail: OUTPATIENT SURGERY
Discharge: HOME | End: 2019-03-12
Attending: SURGERY | Admitting: SURGERY
Payer: COMMERCIAL

## 2019-03-12 ENCOUNTER — ANESTHESIA (OUTPATIENT)
Dept: OPERATING ROOM | Age: 34
End: 2019-03-12
Payer: COMMERCIAL

## 2019-03-12 VITALS — DIASTOLIC BLOOD PRESSURE: 70 MMHG | SYSTOLIC BLOOD PRESSURE: 102 MMHG | OXYGEN SATURATION: 97 %

## 2019-03-12 VITALS
RESPIRATION RATE: 16 BRPM | WEIGHT: 170 LBS | HEIGHT: 63 IN | BODY MASS INDEX: 30.12 KG/M2 | HEART RATE: 72 BPM | DIASTOLIC BLOOD PRESSURE: 75 MMHG | OXYGEN SATURATION: 99 % | SYSTOLIC BLOOD PRESSURE: 108 MMHG | TEMPERATURE: 98.2 F

## 2019-03-12 DIAGNOSIS — D17.21 LIPOMA OF RIGHT UPPER EXTREMITY: Primary | ICD-10-CM

## 2019-03-12 LAB — PREGNANCY, URINE: NEGATIVE

## 2019-03-12 PROCEDURE — 2500000003 HC RX 250 WO HCPCS: Performed by: ANESTHESIOLOGY

## 2019-03-12 PROCEDURE — 2709999900 HC NON-CHARGEABLE SUPPLY: Performed by: SURGERY

## 2019-03-12 PROCEDURE — 7100000010 HC PHASE II RECOVERY - FIRST 15 MIN: Performed by: SURGERY

## 2019-03-12 PROCEDURE — 7100000011 HC PHASE II RECOVERY - ADDTL 15 MIN: Performed by: SURGERY

## 2019-03-12 PROCEDURE — 6360000002 HC RX W HCPCS: Performed by: NURSE ANESTHETIST, CERTIFIED REGISTERED

## 2019-03-12 PROCEDURE — 3700000001 HC ADD 15 MINUTES (ANESTHESIA): Performed by: SURGERY

## 2019-03-12 PROCEDURE — 2500000003 HC RX 250 WO HCPCS: Performed by: SURGERY

## 2019-03-12 PROCEDURE — 3600000012 HC SURGERY LEVEL 2 ADDTL 15MIN: Performed by: SURGERY

## 2019-03-12 PROCEDURE — 3600000002 HC SURGERY LEVEL 2 BASE: Performed by: SURGERY

## 2019-03-12 PROCEDURE — 88304 TISSUE EXAM BY PATHOLOGIST: CPT

## 2019-03-12 PROCEDURE — 2580000003 HC RX 258: Performed by: ANESTHESIOLOGY

## 2019-03-12 PROCEDURE — 2580000003 HC RX 258: Performed by: SURGERY

## 2019-03-12 PROCEDURE — 25075 EXC FOREARM LES SC < 3 CM: CPT | Performed by: SURGERY

## 2019-03-12 PROCEDURE — 3700000000 HC ANESTHESIA ATTENDED CARE: Performed by: SURGERY

## 2019-03-12 PROCEDURE — 84703 CHORIONIC GONADOTROPIN ASSAY: CPT

## 2019-03-12 RX ORDER — OXYCODONE HYDROCHLORIDE AND ACETAMINOPHEN 5; 325 MG/1; MG/1
2 TABLET ORAL PRN
Status: DISCONTINUED | OUTPATIENT
Start: 2019-03-12 | End: 2019-03-12 | Stop reason: HOSPADM

## 2019-03-12 RX ORDER — HYDROMORPHONE HCL 110MG/55ML
0.25 PATIENT CONTROLLED ANALGESIA SYRINGE INTRAVENOUS EVERY 5 MIN PRN
Status: DISCONTINUED | OUTPATIENT
Start: 2019-03-12 | End: 2019-03-12 | Stop reason: HOSPADM

## 2019-03-12 RX ORDER — SODIUM CHLORIDE 0.9 % (FLUSH) 0.9 %
10 SYRINGE (ML) INJECTION EVERY 12 HOURS SCHEDULED
Status: DISCONTINUED | OUTPATIENT
Start: 2019-03-12 | End: 2019-03-12 | Stop reason: HOSPADM

## 2019-03-12 RX ORDER — OXYCODONE HYDROCHLORIDE AND ACETAMINOPHEN 5; 325 MG/1; MG/1
1 TABLET ORAL PRN
Status: DISCONTINUED | OUTPATIENT
Start: 2019-03-12 | End: 2019-03-12 | Stop reason: HOSPADM

## 2019-03-12 RX ORDER — BUPIVACAINE HYDROCHLORIDE 5 MG/ML
INJECTION, SOLUTION PERINEURAL PRN
Status: DISCONTINUED | OUTPATIENT
Start: 2019-03-12 | End: 2019-03-12 | Stop reason: ALTCHOICE

## 2019-03-12 RX ORDER — HYDRALAZINE HYDROCHLORIDE 20 MG/ML
5 INJECTION INTRAMUSCULAR; INTRAVENOUS EVERY 10 MIN PRN
Status: DISCONTINUED | OUTPATIENT
Start: 2019-03-12 | End: 2019-03-12 | Stop reason: HOSPADM

## 2019-03-12 RX ORDER — MAGNESIUM HYDROXIDE 1200 MG/15ML
LIQUID ORAL CONTINUOUS PRN
Status: COMPLETED | OUTPATIENT
Start: 2019-03-12 | End: 2019-03-12

## 2019-03-12 RX ORDER — SODIUM CHLORIDE, SODIUM LACTATE, POTASSIUM CHLORIDE, CALCIUM CHLORIDE 600; 310; 30; 20 MG/100ML; MG/100ML; MG/100ML; MG/100ML
INJECTION, SOLUTION INTRAVENOUS CONTINUOUS
Status: DISCONTINUED | OUTPATIENT
Start: 2019-03-12 | End: 2019-03-12 | Stop reason: HOSPADM

## 2019-03-12 RX ORDER — MEPERIDINE HYDROCHLORIDE 25 MG/ML
12.5 INJECTION INTRAMUSCULAR; INTRAVENOUS; SUBCUTANEOUS EVERY 5 MIN PRN
Status: DISCONTINUED | OUTPATIENT
Start: 2019-03-12 | End: 2019-03-12 | Stop reason: HOSPADM

## 2019-03-12 RX ORDER — ONDANSETRON 2 MG/ML
4 INJECTION INTRAMUSCULAR; INTRAVENOUS EVERY 10 MIN PRN
Status: DISCONTINUED | OUTPATIENT
Start: 2019-03-12 | End: 2019-03-12 | Stop reason: HOSPADM

## 2019-03-12 RX ORDER — PROPOFOL 10 MG/ML
INJECTION, EMULSION INTRAVENOUS PRN
Status: DISCONTINUED | OUTPATIENT
Start: 2019-03-12 | End: 2019-03-12 | Stop reason: SDUPTHER

## 2019-03-12 RX ORDER — HYDROMORPHONE HCL 110MG/55ML
0.5 PATIENT CONTROLLED ANALGESIA SYRINGE INTRAVENOUS EVERY 5 MIN PRN
Status: DISCONTINUED | OUTPATIENT
Start: 2019-03-12 | End: 2019-03-12 | Stop reason: HOSPADM

## 2019-03-12 RX ORDER — LIDOCAINE HYDROCHLORIDE 10 MG/ML
1 INJECTION, SOLUTION EPIDURAL; INFILTRATION; INTRACAUDAL; PERINEURAL
Status: COMPLETED | OUTPATIENT
Start: 2019-03-12 | End: 2019-03-12

## 2019-03-12 RX ORDER — LABETALOL HYDROCHLORIDE 5 MG/ML
5 INJECTION, SOLUTION INTRAVENOUS EVERY 10 MIN PRN
Status: DISCONTINUED | OUTPATIENT
Start: 2019-03-12 | End: 2019-03-12 | Stop reason: HOSPADM

## 2019-03-12 RX ORDER — LIDOCAINE HYDROCHLORIDE 10 MG/ML
INJECTION, SOLUTION EPIDURAL; INFILTRATION; INTRACAUDAL; PERINEURAL PRN
Status: DISCONTINUED | OUTPATIENT
Start: 2019-03-12 | End: 2019-03-12 | Stop reason: ALTCHOICE

## 2019-03-12 RX ORDER — HYDROCODONE BITARTRATE AND ACETAMINOPHEN 5; 325 MG/1; MG/1
1 TABLET ORAL EVERY 6 HOURS PRN
Qty: 12 TABLET | Refills: 0 | Status: SHIPPED | OUTPATIENT
Start: 2019-03-12 | End: 2019-03-15

## 2019-03-12 RX ORDER — SODIUM CHLORIDE 0.9 % (FLUSH) 0.9 %
10 SYRINGE (ML) INJECTION PRN
Status: DISCONTINUED | OUTPATIENT
Start: 2019-03-12 | End: 2019-03-12 | Stop reason: HOSPADM

## 2019-03-12 RX ADMIN — SODIUM CHLORIDE, POTASSIUM CHLORIDE, SODIUM LACTATE AND CALCIUM CHLORIDE: 600; 310; 30; 20 INJECTION, SOLUTION INTRAVENOUS at 12:06

## 2019-03-12 RX ADMIN — PROPOFOL 350 MG: 10 INJECTION, EMULSION INTRAVENOUS at 13:58

## 2019-03-12 RX ADMIN — LIDOCAINE HYDROCHLORIDE 0.2 ML: 10 INJECTION, SOLUTION EPIDURAL; INFILTRATION; INTRACAUDAL; PERINEURAL at 12:06

## 2019-03-12 ASSESSMENT — PULMONARY FUNCTION TESTS
PIF_VALUE: 1
PIF_VALUE: 0
PIF_VALUE: 1
PIF_VALUE: 1
PIF_VALUE: 0
PIF_VALUE: 0
PIF_VALUE: 1
PIF_VALUE: 1
PIF_VALUE: 0
PIF_VALUE: 1
PIF_VALUE: 0
PIF_VALUE: 1
PIF_VALUE: 0

## 2019-03-12 ASSESSMENT — PAIN - FUNCTIONAL ASSESSMENT: PAIN_FUNCTIONAL_ASSESSMENT: 0-10

## 2019-03-12 ASSESSMENT — PAIN SCALES - GENERAL: PAINLEVEL_OUTOF10: 0

## 2019-03-12 ASSESSMENT — ENCOUNTER SYMPTOMS: SHORTNESS OF BREATH: 1

## 2019-03-29 ENCOUNTER — OFFICE VISIT (OUTPATIENT)
Dept: CARDIOLOGY CLINIC | Age: 34
End: 2019-03-29
Payer: COMMERCIAL

## 2019-03-29 VITALS
OXYGEN SATURATION: 98 % | BODY MASS INDEX: 30.72 KG/M2 | DIASTOLIC BLOOD PRESSURE: 70 MMHG | SYSTOLIC BLOOD PRESSURE: 94 MMHG | HEIGHT: 63 IN | HEART RATE: 73 BPM | WEIGHT: 173.4 LBS

## 2019-03-29 DIAGNOSIS — R00.2 PALPITATION: Primary | ICD-10-CM

## 2019-03-29 PROCEDURE — 99213 OFFICE O/P EST LOW 20 MIN: CPT | Performed by: INTERNAL MEDICINE

## 2019-03-29 RX ORDER — SUMATRIPTAN 100 MG/1
100 TABLET, FILM COATED ORAL
COMMUNITY
End: 2019-04-14

## 2019-04-14 ENCOUNTER — HOSPITAL ENCOUNTER (EMERGENCY)
Age: 34
Discharge: HOME | End: 2019-04-14
Attending: EMERGENCY MEDICINE
Payer: COMMERCIAL

## 2019-04-14 ENCOUNTER — APPOINTMENT (OUTPATIENT)
Dept: CT IMAGING | Age: 34
End: 2019-04-14
Payer: COMMERCIAL

## 2019-04-14 VITALS
OXYGEN SATURATION: 98 % | HEART RATE: 77 BPM | TEMPERATURE: 98.5 F | RESPIRATION RATE: 16 BRPM | BODY MASS INDEX: 30.65 KG/M2 | SYSTOLIC BLOOD PRESSURE: 118 MMHG | WEIGHT: 173 LBS | HEIGHT: 63 IN | DIASTOLIC BLOOD PRESSURE: 85 MMHG

## 2019-04-14 DIAGNOSIS — R10.84 GENERALIZED ABDOMINAL PAIN: Primary | ICD-10-CM

## 2019-04-14 DIAGNOSIS — D25.1 INTRAMURAL LEIOMYOMA OF UTERUS: ICD-10-CM

## 2019-04-14 DIAGNOSIS — R19.7 DIARRHEA, UNSPECIFIED TYPE: ICD-10-CM

## 2019-04-14 LAB
A/G RATIO: 1.5 (ref 1.1–2.2)
ALBUMIN SERPL-MCNC: 4.1 G/DL (ref 3.4–5)
ALP BLD-CCNC: 47 U/L (ref 40–129)
ALT SERPL-CCNC: 17 U/L (ref 10–40)
ANION GAP SERPL CALCULATED.3IONS-SCNC: 10 MMOL/L (ref 3–16)
AST SERPL-CCNC: 11 U/L (ref 15–37)
BASOPHILS ABSOLUTE: 0.1 K/UL (ref 0–0.2)
BASOPHILS RELATIVE PERCENT: 0.7 %
BILIRUB SERPL-MCNC: <0.2 MG/DL (ref 0–1)
BILIRUBIN URINE: NEGATIVE
BLOOD, URINE: NEGATIVE
BUN BLDV-MCNC: 14 MG/DL (ref 7–20)
CALCIUM SERPL-MCNC: 8.6 MG/DL (ref 8.3–10.6)
CHLORIDE BLD-SCNC: 107 MMOL/L (ref 99–110)
CLARITY: CLEAR
CO2: 18 MMOL/L (ref 21–32)
COLOR: YELLOW
CREAT SERPL-MCNC: 0.8 MG/DL (ref 0.6–1.1)
EOSINOPHILS ABSOLUTE: 0.2 K/UL (ref 0–0.6)
EOSINOPHILS RELATIVE PERCENT: 2.2 %
GFR AFRICAN AMERICAN: >60
GFR NON-AFRICAN AMERICAN: >60
GLOBULIN: 2.8 G/DL
GLUCOSE BLD-MCNC: 97 MG/DL (ref 70–99)
GLUCOSE URINE: NEGATIVE MG/DL
HCG QUALITATIVE: NEGATIVE
HCT VFR BLD CALC: 40.7 % (ref 36–48)
HEMOGLOBIN: 14 G/DL (ref 12–16)
KETONES, URINE: NEGATIVE MG/DL
LEUKOCYTE ESTERASE, URINE: NEGATIVE
LIPASE: 18 U/L (ref 13–60)
LYMPHOCYTES ABSOLUTE: 3.5 K/UL (ref 1–5.1)
LYMPHOCYTES RELATIVE PERCENT: 36.8 %
MCH RBC QN AUTO: 32.1 PG (ref 26–34)
MCHC RBC AUTO-ENTMCNC: 34.4 G/DL (ref 31–36)
MCV RBC AUTO: 93.3 FL (ref 80–100)
MICROSCOPIC EXAMINATION: NORMAL
MONOCYTES ABSOLUTE: 0.6 K/UL (ref 0–1.3)
MONOCYTES RELATIVE PERCENT: 6.2 %
NEUTROPHILS ABSOLUTE: 5.1 K/UL (ref 1.7–7.7)
NEUTROPHILS RELATIVE PERCENT: 54.1 %
NITRITE, URINE: NEGATIVE
PDW BLD-RTO: 13 % (ref 12.4–15.4)
PH UA: 5.5 (ref 5–8)
PLATELET # BLD: 419 K/UL (ref 135–450)
PMV BLD AUTO: 6.3 FL (ref 5–10.5)
POTASSIUM REFLEX MAGNESIUM: 3.9 MMOL/L (ref 3.5–5.1)
PROTEIN UA: NEGATIVE MG/DL
RBC # BLD: 4.37 M/UL (ref 4–5.2)
SODIUM BLD-SCNC: 135 MMOL/L (ref 136–145)
SPECIFIC GRAVITY UA: 1.02 (ref 1–1.03)
TOTAL PROTEIN: 6.9 G/DL (ref 6.4–8.2)
URINE REFLEX TO CULTURE: NORMAL
URINE TYPE: NORMAL
UROBILINOGEN, URINE: 0.2 E.U./DL
WBC # BLD: 9.5 K/UL (ref 4–11)

## 2019-04-14 PROCEDURE — 84703 CHORIONIC GONADOTROPIN ASSAY: CPT

## 2019-04-14 PROCEDURE — 6360000004 HC RX CONTRAST MEDICATION: Performed by: EMERGENCY MEDICINE

## 2019-04-14 PROCEDURE — 96375 TX/PRO/DX INJ NEW DRUG ADDON: CPT

## 2019-04-14 PROCEDURE — 83690 ASSAY OF LIPASE: CPT

## 2019-04-14 PROCEDURE — 96361 HYDRATE IV INFUSION ADD-ON: CPT

## 2019-04-14 PROCEDURE — 2500000003 HC RX 250 WO HCPCS: Performed by: EMERGENCY MEDICINE

## 2019-04-14 PROCEDURE — 99284 EMERGENCY DEPT VISIT MOD MDM: CPT

## 2019-04-14 PROCEDURE — 6360000002 HC RX W HCPCS: Performed by: EMERGENCY MEDICINE

## 2019-04-14 PROCEDURE — 81003 URINALYSIS AUTO W/O SCOPE: CPT

## 2019-04-14 PROCEDURE — 85025 COMPLETE CBC W/AUTO DIFF WBC: CPT

## 2019-04-14 PROCEDURE — 80053 COMPREHEN METABOLIC PANEL: CPT

## 2019-04-14 PROCEDURE — 74177 CT ABD & PELVIS W/CONTRAST: CPT

## 2019-04-14 PROCEDURE — 96374 THER/PROPH/DIAG INJ IV PUSH: CPT

## 2019-04-14 PROCEDURE — 2580000003 HC RX 258: Performed by: EMERGENCY MEDICINE

## 2019-04-14 RX ORDER — ONDANSETRON 2 MG/ML
4 INJECTION INTRAMUSCULAR; INTRAVENOUS ONCE
Status: COMPLETED | OUTPATIENT
Start: 2019-04-14 | End: 2019-04-14

## 2019-04-14 RX ORDER — KETOROLAC TROMETHAMINE 30 MG/ML
30 INJECTION, SOLUTION INTRAMUSCULAR; INTRAVENOUS ONCE
Status: COMPLETED | OUTPATIENT
Start: 2019-04-14 | End: 2019-04-14

## 2019-04-14 RX ORDER — TRAMADOL HYDROCHLORIDE 50 MG/1
50 TABLET ORAL EVERY 4 HOURS PRN
Qty: 12 TABLET | Refills: 0 | Status: SHIPPED | OUTPATIENT
Start: 2019-04-14 | End: 2019-04-17

## 2019-04-14 RX ORDER — ONDANSETRON 4 MG/1
4 TABLET, ORALLY DISINTEGRATING ORAL EVERY 8 HOURS PRN
Qty: 8 TABLET | Refills: 0 | Status: ON HOLD | OUTPATIENT
Start: 2019-04-14 | End: 2019-08-27

## 2019-04-14 RX ORDER — 0.9 % SODIUM CHLORIDE 0.9 %
1000 INTRAVENOUS SOLUTION INTRAVENOUS ONCE
Status: COMPLETED | OUTPATIENT
Start: 2019-04-14 | End: 2019-04-14

## 2019-04-14 RX ADMIN — FAMOTIDINE 20 MG: 10 INJECTION, SOLUTION INTRAVENOUS at 22:00

## 2019-04-14 RX ADMIN — SODIUM CHLORIDE 1000 ML: 9 INJECTION, SOLUTION INTRAVENOUS at 22:00

## 2019-04-14 RX ADMIN — IOPAMIDOL 75 ML: 755 INJECTION, SOLUTION INTRAVENOUS at 22:29

## 2019-04-14 RX ADMIN — KETOROLAC TROMETHAMINE 30 MG: 30 INJECTION, SOLUTION INTRAMUSCULAR at 22:00

## 2019-04-14 RX ADMIN — ONDANSETRON 4 MG: 2 INJECTION INTRAMUSCULAR; INTRAVENOUS at 22:00

## 2019-04-14 ASSESSMENT — PAIN SCALES - GENERAL
PAINLEVEL_OUTOF10: 3
PAINLEVEL_OUTOF10: 5
PAINLEVEL_OUTOF10: 4

## 2019-04-15 ENCOUNTER — HOSPITAL ENCOUNTER (EMERGENCY)
Age: 34
Discharge: HOME | End: 2019-04-15
Attending: EMERGENCY MEDICINE
Payer: COMMERCIAL

## 2019-04-15 ENCOUNTER — APPOINTMENT (OUTPATIENT)
Dept: CT IMAGING | Age: 34
End: 2019-04-15
Payer: COMMERCIAL

## 2019-04-15 VITALS
HEART RATE: 80 BPM | TEMPERATURE: 98.4 F | RESPIRATION RATE: 16 BRPM | OXYGEN SATURATION: 100 % | WEIGHT: 173 LBS | HEIGHT: 63 IN | BODY MASS INDEX: 30.65 KG/M2 | SYSTOLIC BLOOD PRESSURE: 113 MMHG | DIASTOLIC BLOOD PRESSURE: 88 MMHG

## 2019-04-15 DIAGNOSIS — R11.0 NAUSEA: ICD-10-CM

## 2019-04-15 DIAGNOSIS — R10.31 ABDOMINAL PAIN, RIGHT LOWER QUADRANT: Primary | ICD-10-CM

## 2019-04-15 LAB
A/G RATIO: 1.5 (ref 1.1–2.2)
ALBUMIN SERPL-MCNC: 4 G/DL (ref 3.4–5)
ALP BLD-CCNC: 45 U/L (ref 40–129)
ALT SERPL-CCNC: 12 U/L (ref 10–40)
ANION GAP SERPL CALCULATED.3IONS-SCNC: 9 MMOL/L (ref 3–16)
AST SERPL-CCNC: 10 U/L (ref 15–37)
BASOPHILS ABSOLUTE: 0 K/UL (ref 0–0.2)
BASOPHILS RELATIVE PERCENT: 0.4 %
BILIRUB SERPL-MCNC: <0.2 MG/DL (ref 0–1)
BILIRUBIN URINE: NEGATIVE
BLOOD, URINE: NEGATIVE
BUN BLDV-MCNC: 12 MG/DL (ref 7–20)
CALCIUM SERPL-MCNC: 8.7 MG/DL (ref 8.3–10.6)
CHLORIDE BLD-SCNC: 109 MMOL/L (ref 99–110)
CLARITY: CLEAR
CO2: 21 MMOL/L (ref 21–32)
COLOR: YELLOW
CREAT SERPL-MCNC: 0.6 MG/DL (ref 0.6–1.1)
EOSINOPHILS ABSOLUTE: 0.2 K/UL (ref 0–0.6)
EOSINOPHILS RELATIVE PERCENT: 2.2 %
GFR AFRICAN AMERICAN: >60
GFR NON-AFRICAN AMERICAN: >60
GLOBULIN: 2.7 G/DL
GLUCOSE BLD-MCNC: 95 MG/DL (ref 70–99)
GLUCOSE URINE: NEGATIVE MG/DL
HCG(URINE) PREGNANCY TEST: NEGATIVE
HCT VFR BLD CALC: 40.2 % (ref 36–48)
HEMOGLOBIN: 13.8 G/DL (ref 12–16)
KETONES, URINE: NEGATIVE MG/DL
LEUKOCYTE ESTERASE, URINE: NEGATIVE
LYMPHOCYTES ABSOLUTE: 2.7 K/UL (ref 1–5.1)
LYMPHOCYTES RELATIVE PERCENT: 33.4 %
MCH RBC QN AUTO: 31.9 PG (ref 26–34)
MCHC RBC AUTO-ENTMCNC: 34.3 G/DL (ref 31–36)
MCV RBC AUTO: 92.9 FL (ref 80–100)
MICROSCOPIC EXAMINATION: NORMAL
MONOCYTES ABSOLUTE: 0.5 K/UL (ref 0–1.3)
MONOCYTES RELATIVE PERCENT: 6.7 %
NEUTROPHILS ABSOLUTE: 4.6 K/UL (ref 1.7–7.7)
NEUTROPHILS RELATIVE PERCENT: 57.3 %
NITRITE, URINE: NEGATIVE
PDW BLD-RTO: 12.8 % (ref 12.4–15.4)
PH UA: 7.5 (ref 5–8)
PLATELET # BLD: 418 K/UL (ref 135–450)
PMV BLD AUTO: 6.3 FL (ref 5–10.5)
POTASSIUM SERPL-SCNC: 4.4 MMOL/L (ref 3.5–5.1)
PROTEIN UA: NEGATIVE MG/DL
RBC # BLD: 4.33 M/UL (ref 4–5.2)
SODIUM BLD-SCNC: 139 MMOL/L (ref 136–145)
SPECIFIC GRAVITY UA: 1.01 (ref 1–1.03)
TOTAL PROTEIN: 6.7 G/DL (ref 6.4–8.2)
URINE TYPE: NORMAL
UROBILINOGEN, URINE: 0.2 E.U./DL
WBC # BLD: 8.1 K/UL (ref 4–11)

## 2019-04-15 PROCEDURE — 85025 COMPLETE CBC W/AUTO DIFF WBC: CPT

## 2019-04-15 PROCEDURE — 99284 EMERGENCY DEPT VISIT MOD MDM: CPT

## 2019-04-15 PROCEDURE — 81003 URINALYSIS AUTO W/O SCOPE: CPT

## 2019-04-15 PROCEDURE — 74177 CT ABD & PELVIS W/CONTRAST: CPT

## 2019-04-15 PROCEDURE — 84703 CHORIONIC GONADOTROPIN ASSAY: CPT

## 2019-04-15 PROCEDURE — 6360000002 HC RX W HCPCS: Performed by: EMERGENCY MEDICINE

## 2019-04-15 PROCEDURE — 6360000004 HC RX CONTRAST MEDICATION: Performed by: EMERGENCY MEDICINE

## 2019-04-15 PROCEDURE — 80053 COMPREHEN METABOLIC PANEL: CPT

## 2019-04-15 PROCEDURE — 96374 THER/PROPH/DIAG INJ IV PUSH: CPT

## 2019-04-15 RX ORDER — KETOROLAC TROMETHAMINE 30 MG/ML
15 INJECTION, SOLUTION INTRAMUSCULAR; INTRAVENOUS ONCE
Status: COMPLETED | OUTPATIENT
Start: 2019-04-15 | End: 2019-04-15

## 2019-04-15 RX ADMIN — IOPAMIDOL 75 ML: 755 INJECTION, SOLUTION INTRAVENOUS at 20:19

## 2019-04-15 RX ADMIN — KETOROLAC TROMETHAMINE 15 MG: 30 INJECTION, SOLUTION INTRAMUSCULAR at 20:42

## 2019-04-15 ASSESSMENT — PAIN DESCRIPTION - ORIENTATION: ORIENTATION: MID

## 2019-04-15 ASSESSMENT — ENCOUNTER SYMPTOMS
RHINORRHEA: 0
DIARRHEA: 0
SORE THROAT: 0
ABDOMINAL PAIN: 1
SHORTNESS OF BREATH: 0
CHEST TIGHTNESS: 0
WHEEZING: 0
TROUBLE SWALLOWING: 0
NAUSEA: 1
COUGH: 0
VOMITING: 0
STRIDOR: 0

## 2019-04-15 ASSESSMENT — PAIN SCALES - GENERAL
PAINLEVEL_OUTOF10: 3
PAINLEVEL_OUTOF10: 4
PAINLEVEL_OUTOF10: 3

## 2019-04-15 ASSESSMENT — PAIN DESCRIPTION - PAIN TYPE: TYPE: ACUTE PAIN

## 2019-04-15 NOTE — LETTER
[facility]  ED  [address] [facility] [address]  Phone: [phone]  Fax: [phone]             April 15, 2019    Patient: Essence Graves   YOB: 1985   Date of Visit: 4/15/2019       To Whom It May Concern:    Essence Graves was seen and treated in our emergency department on 4/15/2019. She may return on 4/17/19.       Sincerely,             Signature:__________________________________

## 2019-04-15 NOTE — ED PROVIDER NOTES
[facility]  ED  eMERGENCYdEPARTMENT eNCOUnter      Pt Name: Essence Conklin  MRN: [id number]  Josetrongfalejandra 1985  Date of evaluation: 4/15/2019  Elaine Zhao MD    CHIEF COMPLAINT       Chief Complaint   Patient presents with    Abdominal Pain     Pt states she was seen last night had CT and Blood work not feeling better          HISTORY OF PRESENT ILLNESS   (Location/Symptom, Timing/Onset,Context/Setting, Quality, Duration, Modifying Factors, Severity)  Note limiting factors. Essence Gleason is a 28 y.o. female with no significant medical history who presents to the emergency department for abdominal pain. Patient reports pain started 2 days ago to upper abdominal pain  As well as sharp pain to middle of back that radiates around to right side. Was seen in the ER yesterday with full workup negative findings and was discharged home. However reports today pain has moved downward to periumbilical region and still having intermittent sharp shooting pains to the right side radiating to right lower quadrant. Endorses nausea however denies fever, vomiting, vaginal discharge, vaginal bleeding, dysuria.   -was     HPI    Nursing Notes were reviewed. REVIEW OF SYSTEMS    (2-9 systems for level 4, 10 or more for level 5)     Review of Systems   Constitutional: Negative for activity change, appetite change, diaphoresis and fever. HENT: Negative for congestion, rhinorrhea, sore throat and trouble swallowing. Respiratory: Negative for cough, chest tightness, shortness of breath, wheezing and stridor. Cardiovascular: Negative for chest pain and palpitations. Gastrointestinal: Positive for abdominal pain and nausea. Negative for diarrhea and vomiting. Genitourinary: Negative for dysuria and flank pain. Skin: Negative for rash and wound. Neurological: Negative for dizziness, weakness, light-headedness, numbness and headaches.        Except as noted above the remainder of the review of systems was reviewedand negative. PAST MEDICAL HISTORY     Past Medical History:   Diagnosis Date    Anxiety     Asthma     Ectopic pregnancy     Enlarged pituitary gland ([address] 01/2019    Heart abnormalities     heart palpitations in early pregnancy         SURGICAL HISTORY       Past Surgical History:   Procedure Laterality Date    CHOLECYSTECTOMY  02/16/2017    LAPAROSCOPIC CHOLECYSTECTOMY WITH CHOLANGIOGRAMS    COLONOSCOPY  5/19/14    normal    EXCISION / BIOPSY SKIN LESION OF ARM Right 3/12/2019    EXCISION FOREARM LIPOMA TIMES TWO performed by Luca Hurtado MD at [address] [place] [address] Right 03/12/2019    EXCISION FOREARM LIPOMA TIMES TWO (Right Arm Lower)    WISDOM TOOTH EXTRACTION      3 yrs ago         CURRENT MEDICATIONS       Discharge Medication List as of 4/15/2019  8:56 PM      CONTINUE these medications which have NOT CHANGED    Details   traMADol (ULTRAM) 50 MG tablet Take 1 tablet by mouth every 4 hours as needed for Pain for up to 3 days. Intended supply: 3 days. Take lowest dose possible to manage pain, Disp-12 tablet, R-0Print      ondansetron (ZOFRAN ODT) 4 MG disintegrating tablet Place 1 tablet under the tongue every 8 hours as needed for Nausea or Vomiting, Disp-8 tablet, R-0Print      Topiramate (TOPAMAX PO) Take by mouthHistorical Med      metoprolol tartrate (LOPRESSOR) 25 MG tablet Take 0.5 tablets by mouth 2 times daily, Disp-30 tablet, R-11Pt needs to contact the office for an appt prior to refills. Normal      Cetirizine HCl (ZYRTEC ALLERGY PO) Take by mouthHistorical Med             ALLERGIES     Pcn [penicillins]    FAMILY HISTORY       Family History   Problem Relation Age of Onset    Diabetes Mother     High Cholesterol Mother     High Blood Pressure Mother     Heart Disease Mother     Cancer Maternal Aunt     Heart Disease Maternal Uncle     High Cholesterol Paternal Aunt     Heart Disease Paternal Aunt     High Cholesterol Paternal Uncle  Heart Disease Paternal Uncle     High Cholesterol Maternal Grandmother     Heart Disease Maternal Grandmother     High Cholesterol Maternal Grandfather     Heart Disease Maternal Grandfather           SOCIAL HISTORY       Social History     Socioeconomic History    Marital status:      Spouse name: None    Number of children: None    Years of education: None    Highest education level: None   Occupational History    None   Social Needs    Financial resource strain: None    Food insecurity:     Worry: None     Inability: None    Transportation needs:     Medical: None     Non-medical: None   Tobacco Use    Smoking status: Current Every Day Smoker     Packs/day: 1.00     Years: 3.00     Pack years: 3.00     Types: Cigarettes    Smokeless tobacco: Never Used    Tobacco comment: Vapor    Substance and Sexual Activity    Alcohol use: No    Drug use: No    Sexual activity: Yes     Partners: Male   Lifestyle    Physical activity:     Days per week: None     Minutes per session: None    Stress: None   Relationships    Social connections:     Talks on phone: None     Gets together: None     Attends Quaker service: None     Active member of club or organization: None     Attends meetings of clubs or organizations: None     Relationship status: None    Intimate partner violence:     Fear of current or ex partner: None     Emotionally abused: None     Physically abused: None     Forced sexual activity: None   Other Topics Concern    None   Social History Narrative    None       SCREENINGS             PHYSICAL EXAM    (up to 7 for level 4, 8 ormore for level 5)     ED Triage Vitals [04/15/19 1807]   BP Temp Temp Source Pulse Resp SpO2 Height Weight   139/83 98.4 °F (36.9 °C) Oral 80 14 99 % 5' 3\" (1.6 m) 173 lb (78.5 kg)       Physical Exam   Constitutional: She is oriented to person, place, and time. Vital signs are normal. She appears well-developed and well-nourished. She is cooperative. within normal limits.  -Discussed extensively with patient regarding a repeat CT scan. Emphasized risk associated with increasing radiation exposure with repeated CT scans especially given the fact the patient had one done last night which was negative. Explained to patient that if she is stating that that pain has moved periumbilical and she is having worsening right lower quadrant pain will need to rule out appendicitis once again. Patient states she understands risks would still like to get another CT scan as she is already here and does not want to return again.  -Patient was given toradol in the ED with good symptomatic relief. -CT a/p: No acute abnormality identified. The appendix remains normal in appearance  -Discussed results with patient including negative CT scan. 2 take a muscle relaxant with Tylenol to see if it would help with the back pain. He did to gastroenteritis as patient having persistent diarrhea. As workup was negative and no findings that warrants urgent or emergent intervention at this time will discharge home and have patient follow-up with primary care. . Strict ED return precautions given for new/worsending symptoms. Patient  in agreement with plan, verbally confirm understanding and have no further questions/concerns. REASSESSMENT      Well appearing, non toxic, alert, oriented speaking in full sentences and hemodynamically stable upon discharge      CRITICAL CARE TIME   Total Critical Care time was 0 minutes, excluding separately reportableprocedures. There was a high probability of clinicallysignificant/life threatening deterioration in the patient's condition which required my urgent intervention. CONSULTS:  None    PROCEDURES:  Unless otherwise noted below, none     Procedures    FINAL IMPRESSION      1. Abdominal pain, right lower quadrant    2.  Nausea          DISPOSITION/PLAN   DISPOSITION Decision To Discharge 04/15/2019 08:55:51 PM      PATIENT REFERREDTO:  Rosalind Florentino, APRN - CNP  [address] [facility] [address] [place] [address]  [phone]    Call in 1 day        DISCHARGE MEDICATIONS:  Discharge Medication List as of 4/15/2019  8:56 PM             (Please note that portions of this note were completed with a voice recognition program.  Efforts were made to edit the dictations but occasionally wordsare mis-transcribed.)    Jyoti Erickson MD (electronically signed)  Attending Emergency Physician            Jyoti Erickson MD  04/15/19 3082

## 2019-04-21 NOTE — ED PROVIDER NOTES
CHIEF COMPLAINT  Abdominal Pain (Pt reports abdominal pain starting yesterday, tinder to the touch, pt reports that pain started up high and radiates throughout abdomen and into right side of back. Pt reports diarrhea for the past 2 days, nauseated. Pt reports some dizziness and shortness of breath with activity. )      HISTORY OF PRESENT ILLNESS  April A Wilfredo Ruiz is a 28 y.o. female who presents to the ED complaining of abd pain that started radiating nto her back, pain began 2 days ago., Also now some dizziness and sob with activity. No other complaints, modifying factors or associated symptoms. I have reviewed the following from the nursing documentation.     Past Medical History:   Diagnosis Date    Anxiety     Asthma     Ectopic pregnancy     Enlarged pituitary gland ([facility] [address] 01/2019    Heart abnormalities     heart palpitations in early pregnancy     Past Surgical History:   Procedure Laterality Date    CHOLECYSTECTOMY  02/16/2017    LAPAROSCOPIC CHOLECYSTECTOMY WITH CHOLANGIOGRAMS    COLONOSCOPY  5/19/14    normal    EXCISION / BIOPSY SKIN LESION OF ARM Right 3/12/2019    EXCISION FOREARM LIPOMA TIMES TWO [facility] 03/12/2019    EXCISION FOREARM LIPOMA TIMES TWO (Right Arm Lower)    WISDOM TOOTH EXTRACTION      3 yrs ago     Family History   Problem Relation Age of Onset    Diabetes Mother     High Cholesterol Mother     High Blood Pressure Mother     Heart Disease Mother     Cancer Maternal Aunt     Heart Disease Maternal Uncle     High Cholesterol Paternal Aunt     Heart Disease Paternal Aunt     High Cholesterol Paternal Uncle     Heart Disease Paternal Uncle     High Cholesterol Maternal Grandmother     Heart Disease Maternal Grandmother     High Cholesterol Maternal Grandfather     Heart Disease Maternal Grandfather      Social History     Socioeconomic History    Marital status:      Spouse name: Not on file    Number of children: Not on file    Years of education: Not on file    Highest education level: Not on file   Occupational History    Not on file   Social Needs    Financial resource strain: Not on file    Food insecurity:     Worry: Not on file     Inability: Not on file    Transportation needs:     Medical: Not on file     Non-medical: Not on file   Tobacco Use    Smoking status: Current Every Day Smoker     Packs/day: 1.00     Years: 3.00     Pack years: 3.00     Types: Cigarettes    Smokeless tobacco: Never Used    Tobacco comment: Vapor    Substance and Sexual Activity    Alcohol use: No    Drug use: No    Sexual activity: Yes     Partners: Male   Lifestyle    Physical activity:     Days per week: Not on file     Minutes per session: Not on file    Stress: Not on file   Relationships    Social connections:     Talks on phone: Not on file     Gets together: Not on file     Attends [employer] service: Not on file     Active member of club or organization: Not on file     Attends meetings of clubs or organizations: Not on file     Relationship status: Not on file    Intimate partner violence:     Fear of current or ex partner: Not on file     Emotionally abused: Not on file     Physically abused: Not on file     Forced sexual activity: Not on file   Other Topics Concern    Not on file   Social History Narrative    Not on file     No current facility-administered medications for this encounter.       Current Outpatient Medications   Medication Sig Dispense Refill    ondansetron (ZOFRAN ODT) 4 MG disintegrating tablet Place 1 tablet under the tongue every 8 hours as needed for Nausea or Vomiting 8 tablet 0    Topiramate (TOPAMAX PO) Take by mouth      metoprolol tartrate (LOPRESSOR) 25 MG tablet Take 0.5 tablets by mouth 2 times daily 30 tablet 11    Cetirizine HCl (ZYRTEC ALLERGY PO) Take by mouth       Allergies   Allergen Reactions    Pcn [Penicillins] Hives and Rash       REVIEW OF SYSTEMS  10 systems reviewed, pertinent positives per HPI otherwise noted to be negative. PHYSICAL EXAM  /85   Pulse 77   Temp 98.5 °F (36.9 °C) (Oral)   Resp 16   Ht 5' 3\" (1.6 m)   Wt 173 lb (78.5 kg)   LMP 03/24/2019 (Approximate)   SpO2 98%   BMI 30.65 kg/m²   GENERAL APPEARANCE: Awake and alert. Cooperative. No acute distress. HEAD: Normocephalic. Atraumatic. EYES: PERRL. EOM's grossly intact. ENT: Mucous membranes are moist.   NECK: Supple. HEART: RRR. LUNGS: Respirations unlabored. CTAB. Good air exchange. Speaking comfortably in full sentences. BACK: No midline spinal tenderness or step-off. ABDOMEN: Soft. Non-distended. R lower quadrant tenderness. No guarding or rebound. Normal bowel sounds. EXTREMITIES: No peripheral edema. Moves all extremities equally. All extremities neurovascularly intact. SKIN: Warm and dry. No acute rashes. NEUROLOGICAL: Alert and oriented. No gross facial drooping. Strength 5/5, sensation intact. Normal coordination. Gait normal.   PSYCHIATRIC: Normal mood and affect. LABS  I have reviewed all labs for this visit.    Results for orders placed or performed during the hospital encounter of 04/14/19   CBC Auto Differential   Result Value Ref Range    WBC 9.5 4.0 - 11.0 K/uL    RBC 4.37 4.00 - 5.20 M/uL    Hemoglobin 14.0 12.0 - 16.0 g/dL    Hematocrit 40.7 36.0 - 48.0 %    MCV 93.3 80.0 - 100.0 fL    MCH 32.1 26.0 - 34.0 pg    MCHC 34.4 31.0 - 36.0 g/dL    RDW 13.0 12.4 - 15.4 %    Platelets 052 791 - 540 K/uL    MPV 6.3 5.0 - 10.5 fL    Neutrophils % 54.1 %    Lymphocytes % 36.8 %    Monocytes % 6.2 %    Eosinophils % 2.2 %    Basophils % 0.7 %    Neutrophils # 5.1 1.7 - 7.7 K/uL    Lymphocytes # 3.5 1.0 - 5.1 K/uL    Monocytes # 0.6 0.0 - 1.3 K/uL    Eosinophils # 0.2 0.0 - 0.6 K/uL    Basophils # 0.1 0.0 - 0.2 K/uL   Comprehensive Metabolic Panel w/ Reflex to MG   Result Value Ref Range    Sodium 135 (L) 136 - 145 mmol/L    Potassium reflex Magnesium 3.9 3.5 - 5.1 mmol/L    Chloride 107 99 - 110 mmol/L    CO2 18 (L) 21 - 32 mmol/L    Anion Gap 10 3 - 16    Glucose 97 70 - 99 mg/dL    BUN 14 7 - 20 mg/dL    CREATININE 0.8 0.6 - 1.1 mg/dL    GFR Non-African American >60 >60    GFR African American >60 >60    Calcium 8.6 8.3 - 10.6 mg/dL    Total Protein 6.9 6.4 - 8.2 g/dL    Alb 4.1 3.4 - 5.0 g/dL    Albumin/Globulin Ratio 1.5 1.1 - 2.2    Total Bilirubin <0.2 0.0 - 1.0 mg/dL    Alkaline Phosphatase 47 40 - 129 U/L    ALT 17 10 - 40 U/L    AST 11 (L) 15 - 37 U/L    Globulin 2.8 g/dL   Lipase   Result Value Ref Range    Lipase 18.0 13.0 - 60.0 U/L   HCG Qualitative, Serum   Result Value Ref Range    hCG Qual Negative Detects HCG level >10 MIU/mL   Urinalysis Reflex to Culture   Result Value Ref Range    Color, UA Yellow Straw/Yellow    Clarity, UA Clear Clear    Glucose, Ur Negative Negative mg/dL    Bilirubin Urine Negative Negative    Ketones, Urine Negative Negative mg/dL    Specific Gravity, UA 1.025 1.005 - 1.030    Blood, Urine Negative Negative    pH, UA 5.5 5.0 - 8.0    Protein, UA Negative Negative mg/dL    Urobilinogen, Urine 0.2 <2.0 E.U./dL    Nitrite, Urine Negative Negative    Leukocyte Esterase, Urine Negative Negative    Microscopic Examination Not Indicated     Urine Reflex to Culture Not Indicated     Urine Type Not Specified        RADIOLOGY  X-RAYS:  I have reviewed radiologic plain film image(s). ALL OTHER NON-PLAIN FILM IMAGES SUCH AS CT, ULTRASOUND AND MRI HAVE BEEN READ BY THE RADIOLOGIST. CT ABDOMEN PELVIS W IV CONTRAST Additional Contrast? None   Final Result   No acute abdominopelvic abnormality detected                ED COURSE/MDM  Patient seen and evaluated. Labs and imaging reviewed and results discussed with patient. Plan of care discussed with patient. Patient in agreement with plan. I told the patient they can come back to the ER at any time if the S/S persist or worsen or they have any other S/S of concern. I estimate there is LOW risk for ACUTE APPENDICITIS, BOWEL OBSTRUCTION, CHOLECYSTITIS, DIVERTICULITIS, INCARCERATED HERNIA, PANCREATITIS, PELVIC INFLAMMATORY DISEASE, PERFORATED BOWEL or ULCER, PREGNANCY, or TUBO-OVARIAN ABSCESS, thus I consider the discharge disposition reasonable. Also, there is no evidence or peritonitis, sepsis, or toxicity. Essence Roca and I have discussed the diagnosis and risks, and we agree with discharging home to follow-up with their primary doctor. We also discussed returning to the Emergency Department immediately if new or worsening symptoms occur. We have discussed the symptoms which are most concerning (e.g., bloody stool, fever, changing or worsening pain, vomiting) that necessitate immediate return. Discharge Medication List as of 4/14/2019 11:45 PM      START taking these medications    Details   traMADol (ULTRAM) 50 MG tablet Take 1 tablet by mouth every 4 hours as needed for Pain for up to 3 days. Intended supply: 3 days. Take lowest dose possible to manage pain, Disp-12 tablet, R-0Print      ondansetron (ZOFRAN ODT) 4 MG disintegrating tablet Place 1 tablet under the tongue every 8 hours as needed for Nausea or Vomiting, Disp-8 tablet, R-0Print           CLINICAL IMPRESSION  1. Generalized abdominal pain    2. Intramural leiomyoma of uterus    3. Diarrhea, unspecified type      Blood pressure 118/85, pulse 77, temperature 98.5 °F (36.9 °C), temperature source Oral, resp. rate 16, height 5' 3\" (1.6 m), weight 173 lb (78.5 kg), last menstrual period 03/24/2019, SpO2 98 %, not currently breastfeeding. DISPOSITION  Essence Cam was discharged to home in stable condition. This chart was generated in part by using Dragon Dictation system and may contain errors related to that system including errors in grammar, punctuation, and spelling, as well as words and phrases that may be inappropriate.  When dictating, effort is made to correct spelling/grammar errors. If there are any questions or concerns please feel free to contact the dictating provider for clarification.      Patti Orantes, DO  EMERGENCY MEDICINE        Bill Chang DO  04/21/19 [id number]

## 2019-08-07 ENCOUNTER — HOSPITAL ENCOUNTER (EMERGENCY)
Age: 34
Discharge: HOME | End: 2019-08-07
Attending: EMERGENCY MEDICINE
Payer: COMMERCIAL

## 2019-08-07 ENCOUNTER — APPOINTMENT (OUTPATIENT)
Dept: CT IMAGING | Age: 34
End: 2019-08-07
Payer: COMMERCIAL

## 2019-08-07 VITALS
DIASTOLIC BLOOD PRESSURE: 74 MMHG | BODY MASS INDEX: 30.12 KG/M2 | SYSTOLIC BLOOD PRESSURE: 140 MMHG | TEMPERATURE: 98 F | OXYGEN SATURATION: 99 % | HEIGHT: 63 IN | HEART RATE: 70 BPM | RESPIRATION RATE: 16 BRPM | WEIGHT: 170 LBS

## 2019-08-07 DIAGNOSIS — K52.9 COLITIS: ICD-10-CM

## 2019-08-07 DIAGNOSIS — K62.5 RECTAL BLEEDING: Primary | ICD-10-CM

## 2019-08-07 DIAGNOSIS — N39.0 URINARY TRACT INFECTION WITHOUT HEMATURIA, SITE UNSPECIFIED: ICD-10-CM

## 2019-08-07 DIAGNOSIS — D25.9 UTERINE LEIOMYOMA, UNSPECIFIED LOCATION: ICD-10-CM

## 2019-08-07 LAB
A/G RATIO: 1.4 (ref 1.1–2.2)
ALBUMIN SERPL-MCNC: 4 G/DL (ref 3.4–5)
ALP BLD-CCNC: 56 U/L (ref 40–129)
ALT SERPL-CCNC: 12 U/L (ref 10–40)
AMORPHOUS: ABNORMAL /HPF
ANION GAP SERPL CALCULATED.3IONS-SCNC: 10 MMOL/L (ref 3–16)
AST SERPL-CCNC: 10 U/L (ref 15–37)
BASOPHILS ABSOLUTE: 0 K/UL (ref 0–0.2)
BASOPHILS RELATIVE PERCENT: 0.3 %
BILIRUB SERPL-MCNC: <0.2 MG/DL (ref 0–1)
BILIRUBIN URINE: NEGATIVE
BLOOD, URINE: NEGATIVE
BUN BLDV-MCNC: 13 MG/DL (ref 7–20)
CALCIUM SERPL-MCNC: 9.1 MG/DL (ref 8.3–10.6)
CHLORIDE BLD-SCNC: 107 MMOL/L (ref 99–110)
CLARITY: CLEAR
CO2: 21 MMOL/L (ref 21–32)
COLOR: YELLOW
CREAT SERPL-MCNC: 0.6 MG/DL (ref 0.6–1.1)
EOSINOPHILS ABSOLUTE: 0.2 K/UL (ref 0–0.6)
EOSINOPHILS RELATIVE PERCENT: 1.9 %
EPITHELIAL CELLS, UA: ABNORMAL /HPF
GFR AFRICAN AMERICAN: >60
GFR NON-AFRICAN AMERICAN: >60
GLOBULIN: 2.8 G/DL
GLUCOSE BLD-MCNC: 95 MG/DL (ref 70–99)
GLUCOSE URINE: NEGATIVE MG/DL
HCG QUALITATIVE: NEGATIVE
HCT VFR BLD CALC: 42.2 % (ref 36–48)
HEMOGLOBIN: 14.7 G/DL (ref 12–16)
KETONES, URINE: NEGATIVE MG/DL
LEUKOCYTE ESTERASE, URINE: ABNORMAL
LYMPHOCYTES ABSOLUTE: 2.7 K/UL (ref 1–5.1)
LYMPHOCYTES RELATIVE PERCENT: 29.5 %
MCH RBC QN AUTO: 32.4 PG (ref 26–34)
MCHC RBC AUTO-ENTMCNC: 34.9 G/DL (ref 31–36)
MCV RBC AUTO: 92.8 FL (ref 80–100)
MICROSCOPIC EXAMINATION: YES
MONOCYTES ABSOLUTE: 0.5 K/UL (ref 0–1.3)
MONOCYTES RELATIVE PERCENT: 5 %
NEUTROPHILS ABSOLUTE: 5.8 K/UL (ref 1.7–7.7)
NEUTROPHILS RELATIVE PERCENT: 63.3 %
NITRITE, URINE: NEGATIVE
PDW BLD-RTO: 12.9 % (ref 12.4–15.4)
PH UA: 5.5 (ref 5–8)
PLATELET # BLD: 413 K/UL (ref 135–450)
PMV BLD AUTO: 6.5 FL (ref 5–10.5)
POTASSIUM SERPL-SCNC: 4.2 MMOL/L (ref 3.5–5.1)
PROTEIN UA: NEGATIVE MG/DL
RBC # BLD: 4.54 M/UL (ref 4–5.2)
RBC UA: ABNORMAL /HPF (ref 0–2)
SODIUM BLD-SCNC: 138 MMOL/L (ref 136–145)
SPECIFIC GRAVITY UA: 1.02 (ref 1–1.03)
SPECIMEN STATUS: NORMAL
TOTAL PROTEIN: 6.8 G/DL (ref 6.4–8.2)
URINE TYPE: ABNORMAL
UROBILINOGEN, URINE: 0.2 E.U./DL
WBC # BLD: 9.1 K/UL (ref 4–11)
WBC UA: ABNORMAL /HPF (ref 0–5)

## 2019-08-07 PROCEDURE — 87086 URINE CULTURE/COLONY COUNT: CPT

## 2019-08-07 PROCEDURE — 74177 CT ABD & PELVIS W/CONTRAST: CPT

## 2019-08-07 PROCEDURE — 36415 COLL VENOUS BLD VENIPUNCTURE: CPT

## 2019-08-07 PROCEDURE — 6360000004 HC RX CONTRAST MEDICATION: Performed by: EMERGENCY MEDICINE

## 2019-08-07 PROCEDURE — 6360000002 HC RX W HCPCS: Performed by: EMERGENCY MEDICINE

## 2019-08-07 PROCEDURE — 96365 THER/PROPH/DIAG IV INF INIT: CPT

## 2019-08-07 PROCEDURE — 96375 TX/PRO/DX INJ NEW DRUG ADDON: CPT

## 2019-08-07 PROCEDURE — 81001 URINALYSIS AUTO W/SCOPE: CPT

## 2019-08-07 PROCEDURE — 2580000003 HC RX 258: Performed by: EMERGENCY MEDICINE

## 2019-08-07 PROCEDURE — 84703 CHORIONIC GONADOTROPIN ASSAY: CPT

## 2019-08-07 PROCEDURE — C9113 INJ PANTOPRAZOLE SODIUM, VIA: HCPCS | Performed by: EMERGENCY MEDICINE

## 2019-08-07 PROCEDURE — 80053 COMPREHEN METABOLIC PANEL: CPT

## 2019-08-07 PROCEDURE — 85025 COMPLETE CBC W/AUTO DIFF WBC: CPT

## 2019-08-07 PROCEDURE — 99284 EMERGENCY DEPT VISIT MOD MDM: CPT

## 2019-08-07 RX ORDER — CIPROFLOXACIN 500 MG/1
500 TABLET, FILM COATED ORAL 2 TIMES DAILY
Qty: 14 TABLET | Refills: 0 | Status: SHIPPED | OUTPATIENT
Start: 2019-08-07 | End: 2019-08-14

## 2019-08-07 RX ORDER — PANTOPRAZOLE SODIUM 40 MG/10ML
40 INJECTION, POWDER, LYOPHILIZED, FOR SOLUTION INTRAVENOUS ONCE
Status: COMPLETED | OUTPATIENT
Start: 2019-08-07 | End: 2019-08-07

## 2019-08-07 RX ORDER — 0.9 % SODIUM CHLORIDE 0.9 %
1000 INTRAVENOUS SOLUTION INTRAVENOUS ONCE
Status: COMPLETED | OUTPATIENT
Start: 2019-08-07 | End: 2019-08-07

## 2019-08-07 RX ORDER — METRONIDAZOLE 500 MG/1
500 TABLET ORAL 3 TIMES DAILY
Qty: 21 TABLET | Refills: 0 | Status: SHIPPED | OUTPATIENT
Start: 2019-08-07 | End: 2019-08-14

## 2019-08-07 RX ORDER — CIPROFLOXACIN 2 MG/ML
400 INJECTION, SOLUTION INTRAVENOUS ONCE
Status: COMPLETED | OUTPATIENT
Start: 2019-08-07 | End: 2019-08-07

## 2019-08-07 RX ADMIN — IOPAMIDOL 75 ML: 755 INJECTION, SOLUTION INTRAVENOUS at 16:06

## 2019-08-07 RX ADMIN — CIPROFLOXACIN 400 MG: 2 INJECTION, SOLUTION INTRAVENOUS at 16:18

## 2019-08-07 RX ADMIN — SODIUM CHLORIDE 1000 ML: 9 INJECTION, SOLUTION INTRAVENOUS at 16:18

## 2019-08-07 RX ADMIN — PANTOPRAZOLE SODIUM 40 MG: 40 INJECTION, POWDER, FOR SOLUTION INTRAVENOUS at 16:18

## 2019-08-07 ASSESSMENT — PAIN SCALES - GENERAL: PAINLEVEL_OUTOF10: 2

## 2019-08-07 NOTE — LETTER
[facility]  ED  [address] [address]  Phone: [phone]  Fax: [phone]               August 7, 2019    Patient: Essence Buck   YOB: 1985   Date of Visit: 8/7/2019       To Whom It May Concern:    Essence Buck was seen and treated in our emergency department on 8/7/2019. She may return 8/8/2019.       Sincerely,       Francisca Caballero RN         Signature:__________________________________

## 2019-08-08 LAB — URINE CULTURE, ROUTINE: NORMAL

## 2019-08-27 ENCOUNTER — HOSPITAL ENCOUNTER (OUTPATIENT)
Age: 34
Setting detail: OUTPATIENT SURGERY
Discharge: HOME | End: 2019-08-27
Attending: INTERNAL MEDICINE | Admitting: INTERNAL MEDICINE
Payer: COMMERCIAL

## 2019-08-27 VITALS
BODY MASS INDEX: 30.12 KG/M2 | SYSTOLIC BLOOD PRESSURE: 114 MMHG | HEIGHT: 63 IN | OXYGEN SATURATION: 98 % | WEIGHT: 170 LBS | RESPIRATION RATE: 16 BRPM | HEART RATE: 62 BPM | DIASTOLIC BLOOD PRESSURE: 73 MMHG | TEMPERATURE: 98.6 F

## 2019-08-27 DIAGNOSIS — K64.8 OTHER HEMORRHOIDS: Primary | ICD-10-CM

## 2019-08-27 LAB — PREGNANCY, URINE: NEGATIVE

## 2019-08-27 PROCEDURE — 84703 CHORIONIC GONADOTROPIN ASSAY: CPT

## 2019-08-27 PROCEDURE — 3609010300 HC COLONOSCOPY W/BIOPSY SINGLE/MULTIPLE: Performed by: INTERNAL MEDICINE

## 2019-08-27 PROCEDURE — 7100000010 HC PHASE II RECOVERY - FIRST 15 MIN: Performed by: INTERNAL MEDICINE

## 2019-08-27 PROCEDURE — 88305 TISSUE EXAM BY PATHOLOGIST: CPT

## 2019-08-27 PROCEDURE — 2709999900 HC NON-CHARGEABLE SUPPLY: Performed by: INTERNAL MEDICINE

## 2019-08-27 PROCEDURE — 99152 MOD SED SAME PHYS/QHP 5/>YRS: CPT | Performed by: INTERNAL MEDICINE

## 2019-08-27 PROCEDURE — 7100000011 HC PHASE II RECOVERY - ADDTL 15 MIN: Performed by: INTERNAL MEDICINE

## 2019-08-27 PROCEDURE — 6360000002 HC RX W HCPCS: Performed by: INTERNAL MEDICINE

## 2019-08-27 PROCEDURE — 99153 MOD SED SAME PHYS/QHP EA: CPT | Performed by: INTERNAL MEDICINE

## 2019-08-27 RX ORDER — FENTANYL CITRATE 50 UG/ML
INJECTION, SOLUTION INTRAMUSCULAR; INTRAVENOUS PRN
Status: DISCONTINUED | OUTPATIENT
Start: 2019-08-27 | End: 2019-08-27 | Stop reason: ALTCHOICE

## 2019-08-27 RX ORDER — MIDAZOLAM HYDROCHLORIDE 5 MG/ML
INJECTION INTRAMUSCULAR; INTRAVENOUS PRN
Status: DISCONTINUED | OUTPATIENT
Start: 2019-08-27 | End: 2019-08-27 | Stop reason: ALTCHOICE

## 2019-08-27 RX ORDER — SODIUM CHLORIDE, SODIUM LACTATE, POTASSIUM CHLORIDE, CALCIUM CHLORIDE 600; 310; 30; 20 MG/100ML; MG/100ML; MG/100ML; MG/100ML
INJECTION, SOLUTION INTRAVENOUS CONTINUOUS
Status: DISCONTINUED | OUTPATIENT
Start: 2019-08-27 | End: 2019-08-27 | Stop reason: HOSPADM

## 2019-08-27 ASSESSMENT — PAIN - FUNCTIONAL ASSESSMENT: PAIN_FUNCTIONAL_ASSESSMENT: 0-10

## 2019-08-27 NOTE — H&P
CL, CO2, PHOS, BUN, CREATININE in the last 72 hours. Invalid input(s): CA  LIVER PROFILE: No results for input(s): AST, ALT, LIPASE, PROT, BILIDIR, BILITOT, ALKPHOS in the last 72 hours. Invalid input(s): AMYLASE,  ALB  PT/INR: No results for input(s): INR in the last 72 hours. Invalid input(s): PT    Pre-Procedure Assessment / Plan:  ASA: Class 2 - A normal healthy patient with mild systemic disease  Airway: Mallampati: II (soft palate, uvula, fauces visible)  Level of Sedation Plan: Moderate sedation  Post Procedure plan: Return to same level of care      Plan:   1. Colonoscopy    I assessed the patient and find that the patient is in satisfactory condition to proceed with the planned procedure and sedation plan. I have explained the risk, benefits, and alternatives to the procedure; the patient understands and agrees to proceed.        Jong Curtis  8/27/2019

## 2019-12-02 ENCOUNTER — HOSPITAL ENCOUNTER (EMERGENCY)
Age: 34
Discharge: HOME | End: 2019-12-02
Payer: COMMERCIAL

## 2019-12-02 ENCOUNTER — APPOINTMENT (OUTPATIENT)
Dept: CT IMAGING | Age: 34
End: 2019-12-02
Payer: COMMERCIAL

## 2019-12-02 VITALS
DIASTOLIC BLOOD PRESSURE: 89 MMHG | RESPIRATION RATE: 18 BRPM | WEIGHT: 170 LBS | SYSTOLIC BLOOD PRESSURE: 127 MMHG | OXYGEN SATURATION: 99 % | BODY MASS INDEX: 30.11 KG/M2 | TEMPERATURE: 98.2 F | HEART RATE: 79 BPM

## 2019-12-02 DIAGNOSIS — R10.9 RIGHT FLANK PAIN: Primary | ICD-10-CM

## 2019-12-02 LAB
A/G RATIO: 1.4 (ref 1.1–2.2)
ALBUMIN SERPL-MCNC: 4.1 G/DL (ref 3.4–5)
ALP BLD-CCNC: 57 U/L (ref 40–129)
ALT SERPL-CCNC: 14 U/L (ref 10–40)
ANION GAP SERPL CALCULATED.3IONS-SCNC: 10 MMOL/L (ref 3–16)
AST SERPL-CCNC: 12 U/L (ref 15–37)
BASOPHILS ABSOLUTE: 0 K/UL (ref 0–0.2)
BASOPHILS RELATIVE PERCENT: 0.1 %
BILIRUB SERPL-MCNC: <0.2 MG/DL (ref 0–1)
BILIRUBIN URINE: NEGATIVE
BLOOD, URINE: ABNORMAL
BUN BLDV-MCNC: 13 MG/DL (ref 7–20)
CALCIUM SERPL-MCNC: 8.9 MG/DL (ref 8.3–10.6)
CHLORIDE BLD-SCNC: 106 MMOL/L (ref 99–110)
CLARITY: CLEAR
CO2: 20 MMOL/L (ref 21–32)
COLOR: YELLOW
CREAT SERPL-MCNC: <0.5 MG/DL (ref 0.6–1.1)
EOSINOPHILS ABSOLUTE: 0.2 K/UL (ref 0–0.6)
EOSINOPHILS RELATIVE PERCENT: 1.7 %
EPITHELIAL CELLS, UA: NORMAL /HPF
GFR AFRICAN AMERICAN: >60
GFR NON-AFRICAN AMERICAN: >60
GLOBULIN: 2.9 G/DL
GLUCOSE BLD-MCNC: 91 MG/DL (ref 70–99)
GLUCOSE URINE: NEGATIVE MG/DL
HCG QUALITATIVE: NEGATIVE
HCT VFR BLD CALC: 42.8 % (ref 36–48)
HEMOGLOBIN: 14.3 G/DL (ref 12–16)
KETONES, URINE: NEGATIVE MG/DL
LEUKOCYTE ESTERASE, URINE: NEGATIVE
LIPASE: 19 U/L (ref 13–60)
LYMPHOCYTES ABSOLUTE: 3.1 K/UL (ref 1–5.1)
LYMPHOCYTES RELATIVE PERCENT: 33.5 %
MCH RBC QN AUTO: 31.6 PG (ref 26–34)
MCHC RBC AUTO-ENTMCNC: 33.3 G/DL (ref 31–36)
MCV RBC AUTO: 95 FL (ref 80–100)
MICROSCOPIC EXAMINATION: YES
MONOCYTES ABSOLUTE: 0.6 K/UL (ref 0–1.3)
MONOCYTES RELATIVE PERCENT: 6 %
NEUTROPHILS ABSOLUTE: 5.4 K/UL (ref 1.7–7.7)
NEUTROPHILS RELATIVE PERCENT: 58.7 %
NITRITE, URINE: NEGATIVE
PDW BLD-RTO: 13.1 % (ref 12.4–15.4)
PH UA: 6 (ref 5–8)
PLATELET # BLD: 420 K/UL (ref 135–450)
PMV BLD AUTO: 6.4 FL (ref 5–10.5)
POTASSIUM SERPL-SCNC: 3.8 MMOL/L (ref 3.5–5.1)
PROTEIN UA: NEGATIVE MG/DL
RBC # BLD: 4.51 M/UL (ref 4–5.2)
RBC UA: NORMAL /HPF (ref 0–2)
SODIUM BLD-SCNC: 136 MMOL/L (ref 136–145)
SPECIFIC GRAVITY UA: 1.02 (ref 1–1.03)
TOTAL PROTEIN: 7 G/DL (ref 6.4–8.2)
URINE TYPE: ABNORMAL
UROBILINOGEN, URINE: 0.2 E.U./DL
WBC # BLD: 9.3 K/UL (ref 4–11)
WBC UA: NORMAL /HPF (ref 0–5)

## 2019-12-02 PROCEDURE — 84703 CHORIONIC GONADOTROPIN ASSAY: CPT

## 2019-12-02 PROCEDURE — 96361 HYDRATE IV INFUSION ADD-ON: CPT

## 2019-12-02 PROCEDURE — 6360000004 HC RX CONTRAST MEDICATION: Performed by: PHYSICIAN ASSISTANT

## 2019-12-02 PROCEDURE — 6360000002 HC RX W HCPCS: Performed by: PHYSICIAN ASSISTANT

## 2019-12-02 PROCEDURE — 85025 COMPLETE CBC W/AUTO DIFF WBC: CPT

## 2019-12-02 PROCEDURE — 96375 TX/PRO/DX INJ NEW DRUG ADDON: CPT

## 2019-12-02 PROCEDURE — 2580000003 HC RX 258: Performed by: PHYSICIAN ASSISTANT

## 2019-12-02 PROCEDURE — 81001 URINALYSIS AUTO W/SCOPE: CPT

## 2019-12-02 PROCEDURE — 99284 EMERGENCY DEPT VISIT MOD MDM: CPT

## 2019-12-02 PROCEDURE — 96374 THER/PROPH/DIAG INJ IV PUSH: CPT

## 2019-12-02 PROCEDURE — 80053 COMPREHEN METABOLIC PANEL: CPT

## 2019-12-02 PROCEDURE — 83690 ASSAY OF LIPASE: CPT

## 2019-12-02 PROCEDURE — 74177 CT ABD & PELVIS W/CONTRAST: CPT

## 2019-12-02 RX ORDER — 0.9 % SODIUM CHLORIDE 0.9 %
1000 INTRAVENOUS SOLUTION INTRAVENOUS ONCE
Status: COMPLETED | OUTPATIENT
Start: 2019-12-02 | End: 2019-12-02

## 2019-12-02 RX ORDER — ONDANSETRON 4 MG/1
4 TABLET, ORALLY DISINTEGRATING ORAL EVERY 8 HOURS PRN
Qty: 20 TABLET | Refills: 0 | Status: SHIPPED | OUTPATIENT
Start: 2019-12-02 | End: 2020-04-01

## 2019-12-02 RX ORDER — KETOROLAC TROMETHAMINE 30 MG/ML
30 INJECTION, SOLUTION INTRAMUSCULAR; INTRAVENOUS ONCE
Status: COMPLETED | OUTPATIENT
Start: 2019-12-02 | End: 2019-12-02

## 2019-12-02 RX ORDER — NAPROXEN 500 MG/1
500 TABLET ORAL 2 TIMES DAILY
Qty: 30 TABLET | Refills: 0 | Status: SHIPPED | OUTPATIENT
Start: 2019-12-02 | End: 2020-04-01

## 2019-12-02 RX ORDER — ONDANSETRON 2 MG/ML
4 INJECTION INTRAMUSCULAR; INTRAVENOUS ONCE
Status: COMPLETED | OUTPATIENT
Start: 2019-12-02 | End: 2019-12-02

## 2019-12-02 RX ADMIN — IOPAMIDOL 75 ML: 755 INJECTION, SOLUTION INTRAVENOUS at 21:25

## 2019-12-02 RX ADMIN — ONDANSETRON 4 MG: 2 INJECTION INTRAMUSCULAR; INTRAVENOUS at 21:28

## 2019-12-02 RX ADMIN — KETOROLAC TROMETHAMINE 30 MG: 30 INJECTION, SOLUTION INTRAMUSCULAR at 21:28

## 2019-12-02 RX ADMIN — SODIUM CHLORIDE 1000 ML: 9 INJECTION, SOLUTION INTRAVENOUS at 21:28

## 2019-12-02 ASSESSMENT — PAIN DESCRIPTION - PAIN TYPE: TYPE: ACUTE PAIN

## 2019-12-02 ASSESSMENT — PAIN SCALES - GENERAL
PAINLEVEL_OUTOF10: 2
PAINLEVEL_OUTOF10: 3
PAINLEVEL_OUTOF10: 2

## 2019-12-02 ASSESSMENT — PAIN DESCRIPTION - LOCATION: LOCATION: ABDOMEN

## 2019-12-30 ENCOUNTER — HOSPITAL ENCOUNTER (OUTPATIENT)
Dept: GENERAL RADIOLOGY | Age: 34
Discharge: HOME | End: 2019-12-30
Payer: COMMERCIAL

## 2019-12-30 ENCOUNTER — HOSPITAL ENCOUNTER (OUTPATIENT)
Age: 34
Discharge: HOME | End: 2019-12-30
Payer: COMMERCIAL

## 2019-12-30 DIAGNOSIS — M54.2 NECK PAIN: ICD-10-CM

## 2019-12-30 DIAGNOSIS — M54.6 ACUTE MIDLINE THORACIC BACK PAIN: ICD-10-CM

## 2019-12-30 PROCEDURE — 72040 X-RAY EXAM NECK SPINE 2-3 VW: CPT

## 2019-12-30 PROCEDURE — 72070 X-RAY EXAM THORAC SPINE 2VWS: CPT

## 2020-04-01 ENCOUNTER — APPOINTMENT (OUTPATIENT)
Dept: CT IMAGING | Age: 35
End: 2020-04-01
Payer: COMMERCIAL

## 2020-04-01 ENCOUNTER — HOSPITAL ENCOUNTER (EMERGENCY)
Age: 35
Discharge: HOME | End: 2020-04-02
Payer: COMMERCIAL

## 2020-04-01 LAB
A/G RATIO: 1.6 (ref 1.1–2.2)
ALBUMIN SERPL-MCNC: 4.5 G/DL (ref 3.4–5)
ALP BLD-CCNC: 50 U/L (ref 40–129)
ALT SERPL-CCNC: 19 U/L (ref 10–40)
ANION GAP SERPL CALCULATED.3IONS-SCNC: 11 MMOL/L (ref 3–16)
AST SERPL-CCNC: 13 U/L (ref 15–37)
BACTERIA: ABNORMAL /HPF
BASOPHILS ABSOLUTE: 0 K/UL (ref 0–0.2)
BASOPHILS RELATIVE PERCENT: 0.4 %
BILIRUB SERPL-MCNC: 0.3 MG/DL (ref 0–1)
BILIRUBIN URINE: NEGATIVE
BLOOD, URINE: NEGATIVE
BUN BLDV-MCNC: 14 MG/DL (ref 7–20)
CALCIUM SERPL-MCNC: 9.4 MG/DL (ref 8.3–10.6)
CHLORIDE BLD-SCNC: 105 MMOL/L (ref 99–110)
CLARITY: CLEAR
CO2: 22 MMOL/L (ref 21–32)
COLOR: YELLOW
CREAT SERPL-MCNC: 0.7 MG/DL (ref 0.6–1.1)
EOSINOPHILS ABSOLUTE: 0.3 K/UL (ref 0–0.6)
EOSINOPHILS RELATIVE PERCENT: 2.5 %
EPITHELIAL CELLS, UA: ABNORMAL /HPF (ref 0–5)
GFR AFRICAN AMERICAN: >60
GFR NON-AFRICAN AMERICAN: >60
GLOBULIN: 2.8 G/DL
GLUCOSE BLD-MCNC: 113 MG/DL (ref 70–99)
GLUCOSE URINE: NEGATIVE MG/DL
HCT VFR BLD CALC: 42.4 % (ref 36–48)
HEMOGLOBIN: 14.4 G/DL (ref 12–16)
KETONES, URINE: NEGATIVE MG/DL
LEUKOCYTE ESTERASE, URINE: ABNORMAL
LIPASE: 20 U/L (ref 13–60)
LYMPHOCYTES ABSOLUTE: 3.7 K/UL (ref 1–5.1)
LYMPHOCYTES RELATIVE PERCENT: 33.2 %
MCH RBC QN AUTO: 31.8 PG (ref 26–34)
MCHC RBC AUTO-ENTMCNC: 33.9 G/DL (ref 31–36)
MCV RBC AUTO: 93.9 FL (ref 80–100)
MICROSCOPIC EXAMINATION: YES
MONOCYTES ABSOLUTE: 0.8 K/UL (ref 0–1.3)
MONOCYTES RELATIVE PERCENT: 7.1 %
MUCUS: ABNORMAL /LPF
NEUTROPHILS ABSOLUTE: 6.4 K/UL (ref 1.7–7.7)
NEUTROPHILS RELATIVE PERCENT: 56.8 %
NITRITE, URINE: NEGATIVE
PDW BLD-RTO: 13 % (ref 12.4–15.4)
PH UA: 6.5 (ref 5–8)
PLATELET # BLD: 414 K/UL (ref 135–450)
PMV BLD AUTO: 6.2 FL (ref 5–10.5)
POTASSIUM SERPL-SCNC: 3.6 MMOL/L (ref 3.5–5.1)
PROTEIN UA: NEGATIVE MG/DL
RBC # BLD: 4.52 M/UL (ref 4–5.2)
RBC UA: ABNORMAL /HPF (ref 0–4)
SODIUM BLD-SCNC: 138 MMOL/L (ref 136–145)
SPECIFIC GRAVITY UA: 1.02 (ref 1–1.03)
TOTAL PROTEIN: 7.3 G/DL (ref 6.4–8.2)
URINE REFLEX TO CULTURE: YES
URINE TYPE: ABNORMAL
UROBILINOGEN, URINE: 0.2 E.U./DL
WBC # BLD: 11.3 K/UL (ref 4–11)
WBC UA: ABNORMAL /HPF (ref 0–5)

## 2020-04-01 PROCEDURE — 87086 URINE CULTURE/COLONY COUNT: CPT

## 2020-04-01 PROCEDURE — 85025 COMPLETE CBC W/AUTO DIFF WBC: CPT

## 2020-04-01 PROCEDURE — 81001 URINALYSIS AUTO W/SCOPE: CPT

## 2020-04-01 PROCEDURE — 80053 COMPREHEN METABOLIC PANEL: CPT

## 2020-04-01 PROCEDURE — 99284 EMERGENCY DEPT VISIT MOD MDM: CPT

## 2020-04-01 PROCEDURE — 83690 ASSAY OF LIPASE: CPT

## 2020-04-01 PROCEDURE — 84703 CHORIONIC GONADOTROPIN ASSAY: CPT

## 2020-04-01 RX ORDER — 0.9 % SODIUM CHLORIDE 0.9 %
1000 INTRAVENOUS SOLUTION INTRAVENOUS ONCE
Status: COMPLETED | OUTPATIENT
Start: 2020-04-01 | End: 2020-04-02

## 2020-04-01 RX ORDER — ONDANSETRON 2 MG/ML
4 INJECTION INTRAMUSCULAR; INTRAVENOUS ONCE
Status: COMPLETED | OUTPATIENT
Start: 2020-04-01 | End: 2020-04-02

## 2020-04-01 ASSESSMENT — PAIN DESCRIPTION - ORIENTATION: ORIENTATION: MID

## 2020-04-01 ASSESSMENT — PAIN SCALES - GENERAL: PAINLEVEL_OUTOF10: 4

## 2020-04-01 ASSESSMENT — PAIN DESCRIPTION - PAIN TYPE: TYPE: ACUTE PAIN

## 2020-04-01 ASSESSMENT — PAIN DESCRIPTION - ONSET: ONSET: ON-GOING

## 2020-04-01 ASSESSMENT — PAIN DESCRIPTION - DESCRIPTORS: DESCRIPTORS: STABBING

## 2020-04-01 ASSESSMENT — PAIN DESCRIPTION - FREQUENCY: FREQUENCY: CONTINUOUS

## 2020-04-01 ASSESSMENT — PAIN DESCRIPTION - LOCATION: LOCATION: ABDOMEN

## 2020-04-02 ENCOUNTER — APPOINTMENT (OUTPATIENT)
Dept: CT IMAGING | Age: 35
End: 2020-04-02
Payer: COMMERCIAL

## 2020-04-02 VITALS
WEIGHT: 170 LBS | OXYGEN SATURATION: 98 % | DIASTOLIC BLOOD PRESSURE: 82 MMHG | RESPIRATION RATE: 16 BRPM | SYSTOLIC BLOOD PRESSURE: 118 MMHG | HEART RATE: 89 BPM | BODY MASS INDEX: 30.12 KG/M2 | HEIGHT: 63 IN | TEMPERATURE: 98 F

## 2020-04-02 LAB — HCG QUALITATIVE: NEGATIVE

## 2020-04-02 PROCEDURE — 96375 TX/PRO/DX INJ NEW DRUG ADDON: CPT

## 2020-04-02 PROCEDURE — 6370000000 HC RX 637 (ALT 250 FOR IP): Performed by: PHYSICIAN ASSISTANT

## 2020-04-02 PROCEDURE — 6360000004 HC RX CONTRAST MEDICATION: Performed by: PHYSICIAN ASSISTANT

## 2020-04-02 PROCEDURE — 2580000003 HC RX 258: Performed by: PHYSICIAN ASSISTANT

## 2020-04-02 PROCEDURE — 6360000002 HC RX W HCPCS: Performed by: PHYSICIAN ASSISTANT

## 2020-04-02 PROCEDURE — 74177 CT ABD & PELVIS W/CONTRAST: CPT

## 2020-04-02 PROCEDURE — 96374 THER/PROPH/DIAG INJ IV PUSH: CPT

## 2020-04-02 RX ORDER — NITROFURANTOIN 25; 75 MG/1; MG/1
100 CAPSULE ORAL 2 TIMES DAILY
Qty: 10 CAPSULE | Refills: 0 | Status: SHIPPED | OUTPATIENT
Start: 2020-04-02 | End: 2020-04-07

## 2020-04-02 RX ORDER — ONDANSETRON 4 MG/1
4 TABLET, ORALLY DISINTEGRATING ORAL EVERY 8 HOURS PRN
Qty: 15 TABLET | Refills: 0 | Status: SHIPPED | OUTPATIENT
Start: 2020-04-02 | End: 2020-11-05 | Stop reason: SDUPTHER

## 2020-04-02 RX ORDER — NITROFURANTOIN 25; 75 MG/1; MG/1
100 CAPSULE ORAL ONCE
Status: COMPLETED | OUTPATIENT
Start: 2020-04-02 | End: 2020-04-02

## 2020-04-02 RX ORDER — KETOROLAC TROMETHAMINE 30 MG/ML
15 INJECTION, SOLUTION INTRAMUSCULAR; INTRAVENOUS ONCE
Status: COMPLETED | OUTPATIENT
Start: 2020-04-02 | End: 2020-04-02

## 2020-04-02 RX ADMIN — SODIUM CHLORIDE 1000 ML: 9 INJECTION, SOLUTION INTRAVENOUS at 00:56

## 2020-04-02 RX ADMIN — IOPAMIDOL 75 ML: 755 INJECTION, SOLUTION INTRAVENOUS at 00:02

## 2020-04-02 RX ADMIN — ONDANSETRON 4 MG: 2 INJECTION INTRAMUSCULAR; INTRAVENOUS at 00:56

## 2020-04-02 RX ADMIN — KETOROLAC TROMETHAMINE 15 MG: 30 INJECTION, SOLUTION INTRAMUSCULAR at 00:56

## 2020-04-02 RX ADMIN — NITROFURANTOIN (MONOHYDRATE/MACROCRYSTALS) 100 MG: 75; 25 CAPSULE ORAL at 00:56

## 2020-04-02 ASSESSMENT — ENCOUNTER SYMPTOMS
VOMITING: 0
COLOR CHANGE: 0
DIARRHEA: 0
EYES NEGATIVE: 1
BACK PAIN: 0
NAUSEA: 1
SHORTNESS OF BREATH: 0
COUGH: 0
ABDOMINAL PAIN: 1

## 2020-04-02 ASSESSMENT — PAIN SCALES - GENERAL: PAINLEVEL_OUTOF10: 3

## 2020-04-02 NOTE — ED NOTES
Patient ambulated to restroom at this time to provide urine for UA, steady gait.       [facility]  04/01/20 4532

## 2020-04-02 NOTE — ED PROVIDER NOTES
the ROS, all other systems were reviewed and negative.          PAST MEDICAL HISTORY:     Past Medical History:   Diagnosis Date    Anxiety     Asthma     Ectopic pregnancy     Enlarged pituitary gland ([address] 01/2019    Heart abnormalities     heart palpitations in early pregnancy         SURGICAL HISTORY:      Past Surgical History:   Procedure Laterality Date    CHOLECYSTECTOMY  02/16/2017    LAPAROSCOPIC CHOLECYSTECTOMY WITH CHOLANGIOGRAMS    COLONOSCOPY  5/19/14    normal    COLONOSCOPY N/A 8/27/2019    COLONOSCOPY WITH BIOPSY performed by Zeeshan Alexander DO at [facility] / BIOPSY SKIN LESION OF ARM Right 3/12/2019    EXCISION FOREARM LIPOMA TIMES TWO performed by Fatoumata José MD at [facility] Right 03/12/2019    EXCISION FOREARM LIPOMA TIMES TWO (Right Arm Lower)    WISDOM TOOTH EXTRACTION      3 yrs ago         CURRENT MEDICATIONS:       Previous Medications    CETIRIZINE HCL (ZYRTEC ALLERGY PO)    Take by mouth    TOPIRAMATE (TOPAMAX PO)    Take by mouth         ALLERGIES:    Pcn [penicillins]    FAMILY HISTORY:       Family History   Problem Relation Age of Onset    Diabetes Mother     High Cholesterol Mother     High Blood Pressure Mother     Heart Disease Mother     Cancer Maternal Aunt     Heart Disease Maternal Uncle     High Cholesterol Paternal Aunt     Heart Disease Paternal Aunt     High Cholesterol Paternal Uncle     Heart Disease Paternal Uncle     High Cholesterol Maternal Grandmother     Heart Disease Maternal Grandmother     High Cholesterol Maternal Grandfather     Heart Disease Maternal Grandfather           SOCIAL HISTORY:       Social History     Socioeconomic History    Marital status:      Spouse name: None    Number of children: None    Years of education: None    Highest education level: None   Occupational History    None   Social Needs    Financial resource strain: None    Food insecurity (MACROBID) capsule 100 mg (has no administration in time range)   iopamidol (ISOVUE-370) 76 % injection 75 mL (75 mLs Intravenous Given 4/2/20 0002)         I have evaluated this patient in the ED. Old records were reviewed. Patient arrives to the ED describing vague abdominal pain this afternoon that has slightly worsened but still remains mild overall. She rated the pain 4. She has a fairly benign exam but due to the fact that she is never had pain like this before and she describes a periumbilical pain migrating to the right lower quadrant, I certainly had to assess her with labs, urine and CT imaging of the abdomen and pelvis. CBC reveals white count of 11.3 but is otherwise unremarkable. CMP normal.  Lipase negative. hCG negative. Urinalysis shows 6-10 white blood cells with rare bacteria. CT imaging of the abdomen and pelvis shows a left lateral uterine myometrial vascular enhancing mass lesion measuring up to 3.3 cm in diameter which is suggestive of an unchanged leiomyoma. I made patient aware of this. Cholecystectomy. Splenic chronic granulomatous disease. Otherwise normal.  Patient received 1 L normal saline IV with Zofran 4 mg IV and Toradol 15 mg IV. She is given a dose of Macrobid orally prior to discharge but ultimately I recommended primary care and OB/GYN follow-up. She will be prescribed a course of Macrobid for home for UTI as well as Zofran to use as needed for nausea. I estimate there is LOW risk for ACUTE APPENDICITIS, PYELONEPHRITIS, BOWEL OBSTRUCTION, CHOLECYSTITIS, DIVERTICULITIS, INCARCERATED HERNIA, PANCREATITIS, PELVIC INFLAMMATORY DISEASE, PERFORATED BOWEL or ULCER, PREGNANCY/ECTOPIC PREGNANCY, or TUBO-OVARIAN ABSCESS, thus I consider the discharge disposition reasonable. Also, there is no evidence or peritonitis, sepsis, or toxicity. Essence Roca and I have discussed the diagnosis and risks, and we agree with discharging home to follow-up with their primary doctor.  We

## 2020-04-02 NOTE — ED NOTES
--Patient provided with discharge instructions. --Instructions, and follow-up appointments reviewed with patient/family. No further questions or needs at this time. --Vital signs and patient stable upon discharge. --Patient ambulatory to [facility].     --dsd applied to IV site and bleeding controlled      Camden Boston RN  04/02/20 [id number]

## 2020-04-03 LAB — URINE CULTURE, ROUTINE: NORMAL

## 2020-11-04 LAB
A/G RATIO: 1.6 (ref 1.1–2.2)
ALBUMIN SERPL-MCNC: 4.4 G/DL (ref 3.4–5)
ALP BLD-CCNC: 55 U/L (ref 40–129)
ALT SERPL-CCNC: 18 U/L (ref 10–40)
ANION GAP SERPL CALCULATED.3IONS-SCNC: 11 MMOL/L (ref 3–16)
AST SERPL-CCNC: 14 U/L (ref 15–37)
BASOPHILS ABSOLUTE: 0 K/UL (ref 0–0.2)
BASOPHILS RELATIVE PERCENT: 0.2 %
BILIRUB SERPL-MCNC: <0.2 MG/DL (ref 0–1)
BILIRUBIN URINE: NEGATIVE
BLOOD, URINE: ABNORMAL
BUN BLDV-MCNC: 14 MG/DL (ref 7–20)
CALCIUM SERPL-MCNC: 9.5 MG/DL (ref 8.3–10.6)
CHLORIDE BLD-SCNC: 105 MMOL/L (ref 99–110)
CLARITY: CLEAR
CO2: 21 MMOL/L (ref 21–32)
COLOR: YELLOW
CREAT SERPL-MCNC: 0.6 MG/DL (ref 0.6–1.1)
EOSINOPHILS ABSOLUTE: 0.2 K/UL (ref 0–0.6)
EOSINOPHILS RELATIVE PERCENT: 1.7 %
EPITHELIAL CELLS, UA: NORMAL /HPF (ref 0–5)
GFR AFRICAN AMERICAN: >60
GFR NON-AFRICAN AMERICAN: >60
GLOBULIN: 2.7 G/DL
GLUCOSE BLD-MCNC: 142 MG/DL (ref 70–99)
GLUCOSE URINE: NEGATIVE MG/DL
HCG QUALITATIVE: NEGATIVE
HCT VFR BLD CALC: 42.1 % (ref 36–48)
HEMOGLOBIN: 14.4 G/DL (ref 12–16)
KETONES, URINE: NEGATIVE MG/DL
LEUKOCYTE ESTERASE, URINE: NEGATIVE
LIPASE: 28 U/L (ref 13–60)
LYMPHOCYTES ABSOLUTE: 2.9 K/UL (ref 1–5.1)
LYMPHOCYTES RELATIVE PERCENT: 22.5 %
MCH RBC QN AUTO: 31.7 PG (ref 26–34)
MCHC RBC AUTO-ENTMCNC: 34.1 G/DL (ref 31–36)
MCV RBC AUTO: 92.7 FL (ref 80–100)
MICROSCOPIC EXAMINATION: YES
MONOCYTES ABSOLUTE: 0.6 K/UL (ref 0–1.3)
MONOCYTES RELATIVE PERCENT: 4.6 %
NEUTROPHILS ABSOLUTE: 9.2 K/UL (ref 1.7–7.7)
NEUTROPHILS RELATIVE PERCENT: 71 %
NITRITE, URINE: NEGATIVE
PDW BLD-RTO: 12.3 % (ref 12.4–15.4)
PH UA: 6.5 (ref 5–8)
PLATELET # BLD: 473 K/UL (ref 135–450)
PMV BLD AUTO: 6.5 FL (ref 5–10.5)
POTASSIUM SERPL-SCNC: 3.6 MMOL/L (ref 3.5–5.1)
PROTEIN UA: NEGATIVE MG/DL
RBC # BLD: 4.54 M/UL (ref 4–5.2)
RBC UA: NORMAL /HPF (ref 0–4)
SODIUM BLD-SCNC: 137 MMOL/L (ref 136–145)
SPECIFIC GRAVITY UA: 1.01 (ref 1–1.03)
TOTAL PROTEIN: 7.1 G/DL (ref 6.4–8.2)
URINE TYPE: ABNORMAL
UROBILINOGEN, URINE: 0.2 E.U./DL
WBC # BLD: 13 K/UL (ref 4–11)
WBC UA: NORMAL /HPF (ref 0–5)

## 2020-11-04 PROCEDURE — 85025 COMPLETE CBC W/AUTO DIFF WBC: CPT

## 2020-11-04 PROCEDURE — 96374 THER/PROPH/DIAG INJ IV PUSH: CPT

## 2020-11-04 PROCEDURE — 84703 CHORIONIC GONADOTROPIN ASSAY: CPT

## 2020-11-04 PROCEDURE — 80053 COMPREHEN METABOLIC PANEL: CPT

## 2020-11-04 PROCEDURE — 81001 URINALYSIS AUTO W/SCOPE: CPT

## 2020-11-04 PROCEDURE — 83690 ASSAY OF LIPASE: CPT

## 2020-11-04 PROCEDURE — 99284 EMERGENCY DEPT VISIT MOD MDM: CPT

## 2020-11-04 ASSESSMENT — PAIN SCALES - GENERAL: PAINLEVEL_OUTOF10: 3

## 2020-11-04 ASSESSMENT — PAIN DESCRIPTION - ORIENTATION: ORIENTATION: RIGHT

## 2020-11-04 ASSESSMENT — PAIN DESCRIPTION - PAIN TYPE: TYPE: ACUTE PAIN

## 2020-11-04 ASSESSMENT — PAIN DESCRIPTION - LOCATION: LOCATION: ABDOMEN

## 2020-11-05 ENCOUNTER — HOSPITAL ENCOUNTER (EMERGENCY)
Age: 35
Discharge: HOME | End: 2020-11-05
Payer: COMMERCIAL

## 2020-11-05 ENCOUNTER — APPOINTMENT (OUTPATIENT)
Dept: CT IMAGING | Age: 35
End: 2020-11-05
Payer: COMMERCIAL

## 2020-11-05 VITALS
SYSTOLIC BLOOD PRESSURE: 107 MMHG | WEIGHT: 175 LBS | TEMPERATURE: 97.8 F | HEIGHT: 63 IN | DIASTOLIC BLOOD PRESSURE: 77 MMHG | HEART RATE: 70 BPM | OXYGEN SATURATION: 97 % | BODY MASS INDEX: 31.01 KG/M2 | RESPIRATION RATE: 16 BRPM

## 2020-11-05 PROCEDURE — 6360000004 HC RX CONTRAST MEDICATION: Performed by: PHYSICIAN ASSISTANT

## 2020-11-05 PROCEDURE — 2580000003 HC RX 258: Performed by: PHYSICIAN ASSISTANT

## 2020-11-05 PROCEDURE — 74177 CT ABD & PELVIS W/CONTRAST: CPT

## 2020-11-05 PROCEDURE — 6360000002 HC RX W HCPCS: Performed by: PHYSICIAN ASSISTANT

## 2020-11-05 RX ORDER — TRAMADOL HYDROCHLORIDE 50 MG/1
50 TABLET ORAL EVERY 6 HOURS PRN
Qty: 10 TABLET | Refills: 0 | Status: SHIPPED | OUTPATIENT
Start: 2020-11-05 | End: 2020-11-08

## 2020-11-05 RX ORDER — 0.9 % SODIUM CHLORIDE 0.9 %
1000 INTRAVENOUS SOLUTION INTRAVENOUS ONCE
Status: COMPLETED | OUTPATIENT
Start: 2020-11-05 | End: 2020-11-05

## 2020-11-05 RX ORDER — ONDANSETRON 4 MG/1
4 TABLET, ORALLY DISINTEGRATING ORAL EVERY 8 HOURS PRN
Qty: 15 TABLET | Refills: 0 | Status: SHIPPED | OUTPATIENT
Start: 2020-11-05 | End: 2021-09-15

## 2020-11-05 RX ORDER — ONDANSETRON 2 MG/ML
4 INJECTION INTRAMUSCULAR; INTRAVENOUS ONCE
Status: COMPLETED | OUTPATIENT
Start: 2020-11-05 | End: 2020-11-05

## 2020-11-05 RX ADMIN — ONDANSETRON 4 MG: 2 INJECTION INTRAMUSCULAR; INTRAVENOUS at 01:15

## 2020-11-05 RX ADMIN — SODIUM CHLORIDE 1000 ML: 9 INJECTION, SOLUTION INTRAVENOUS at 01:15

## 2020-11-05 RX ADMIN — IOPAMIDOL 75 ML: 755 INJECTION, SOLUTION INTRAVENOUS at 01:35

## 2020-11-05 NOTE — ED NOTES
Discharge instructions reviewed with Pt. Pt verbalizes understanding at this time. Prescriptions/medications reviewed with pt at this time. VS as noted. Pt condition stable at this time. No concerns voiced.       Hoa Andres RN  11/05/20 [id number]

## 2020-11-05 NOTE — LETTER
[facility]  [address] [address]  Phone: [phone]  Fax: [phone]               November 5, 2020    Patient: Essence Hicks   YOB: 1985   Date of Visit: 11/4/2020       To Whom It May Concern:    Essence Hicks was seen and treated in our emergency department on 11/4/2020. She may return to work on 11/6/20.       Sincerely,       KHLOE Isabel         Signature:__________________________________

## 2020-11-06 NOTE — ED PROVIDER NOTES
[facility] Emergency Department    CHIEF COMPLAINT  Abdominal Pain (over last few days, has had blood in stools reporting \"clots\" and \"feeling foggy\" )      SHARED SERVICE VISIT  Evaluated by NEW. My supervising physician was available for consultation. HISTORY OF PRESENT ILLNESS  April REMY Mata is a 38 y.o. female who presents to the ED complaining of several day history of abdominal discomfort. Patient drove herself in today for evaluation. Associated lower abdominal discomfort and cramping over the past several days. Reports that she is also noted some blood in the stool. No diarrhea or constipation. Nauseous without vomiting. No diarrhea or constipation. She has had no fevers chills. Chest pain shortness of breath. She denies headache, lightheadedness, dizziness or confusion. Reports that she feels foggy. She denies any vaginal bleeding, pain or discharge. No pain with urination. No back pain. History of cholecystectomy. States that she has had similar episodes in the past with prior colonoscopies. No other complaints, modifying factors or associated symptoms. Nursing notes reviewed.    Past Medical History:   Diagnosis Date    Anxiety     Asthma     Ectopic pregnancy     Enlarged pituitary gland ([facility] [address] 01/2019    Heart abnormalities     heart palpitations in early pregnancy     Past Surgical History:   Procedure Laterality Date    CHOLECYSTECTOMY  02/16/2017    LAPAROSCOPIC CHOLECYSTECTOMY WITH CHOLANGIOGRAMS    COLONOSCOPY  5/19/14    normal    COLONOSCOPY N/A 8/27/2019    COLONOSCOPY WITH BIOPSY performed by Ligia Palma DO at [facility] / BIOPSY SKIN LESION OF ARM Right 3/12/2019    EXCISION FOREARM LIPOMA TIMES TWO performed by Mitchell Bangura MD at [place] [id number] Right 03/12/2019    EXCISION FOREARM LIPOMA TIMES TWO (Right Arm Lower)    WISDOM TOOTH EXTRACTION      3 yrs ago     Family History   Problem Relation Age of Onset    Diabetes Mother     High Cholesterol Mother     High Blood Pressure Mother     Heart Disease Mother     Cancer Maternal Aunt     Heart Disease Maternal Uncle     High Cholesterol Paternal Aunt     Heart Disease Paternal Aunt     High Cholesterol Paternal Uncle     Heart Disease Paternal Uncle     High Cholesterol Maternal Grandmother     Heart Disease Maternal Grandmother     High Cholesterol Maternal Grandfather     Heart Disease Maternal Grandfather      Social History     Socioeconomic History    Marital status:      Spouse name: Not on file    Number of children: Not on file    Years of education: Not on file    Highest education level: Not on file   Occupational History    Not on file   Social Needs    Financial resource strain: Not on file    Food insecurity     Worry: Not on file     Inability: Not on file    Transportation needs     Medical: Not on file     Non-medical: Not on file   Tobacco Use    Smoking status: Current Every Day Smoker     Packs/day: 1.00     Years: 3.00     Pack years: 3.00     Types: Cigarettes    Smokeless tobacco: Never Used    Tobacco comment: Vapor    Substance and Sexual Activity    Alcohol use: No    Drug use: No    Sexual activity: Yes     Partners: Male   Lifestyle    Physical activity     Days per week: Not on file     Minutes per session: Not on file    Stress: Not on file   Relationships    Social connections     Talks on phone: Not on file     Gets together: Not on file     Attends [employer] service: Not on file     Active member of club or organization: Not on file     Attends meetings of clubs or organizations: Not on file     Relationship status: Not on file    Intimate partner violence     Fear of current or ex partner: Not on file     Emotionally abused: Not on file     Physically abused: Not on file     Forced sexual activity: Not on file   Other Topics Concern    Not on file   Social TECHNIQUE: CT of the abdomen and pelvis was performed with the administration of intravenous contrast. Multiplanar reformatted images are provided for review. Dose modulation, iterative reconstruction, and/or weight based adjustment of the mA/kV was utilized to reduce the radiation dose to as low as reasonably achievable. COMPARISON: 04/02/2020 HISTORY: ORDERING SYSTEM PROVIDED HISTORY: abd TECHNOLOGIST PROVIDED HISTORY: Reason for exam:->abd Additional Contrast?->None Reason for Exam: Abdominal pains, nausea, blood in stool Relevant Medical/Surgical History: Cholecystectomy FINDINGS: Lower Chest: Minimal bibasilar dependent atelectasis. Organs: Unremarkable liver, pancreas, adrenals, and bilateral kidneys. Multiple calcified granulomas in the spleen. GI/Bowel: Status post cholecystectomy. Normal appendix. No evidence of bowel obstruction. Pelvis: IUD in place. Stable 3.3 cm hyperdense/hyperenhancing fibroid in the left aspect of the uterus. No definite adnexal mass. Peritoneum/Retroperitoneum: Minimal vascular calcification. No free air or free fluid. No adenopathy. Bones/Soft Tissues: Intact osseous structures. No acute finding in the abdomen pelvis. No CT evidence of acute colitis. ED COURSE  Patient received Zofran IV for nausea, with good relief. Triage vitals stable. Given a 1 L IV fluid bolus. CBC with mild leukocytosis 13.0 with left shift. Bandemia. CMP unremarkable. hCG negative. Lipase normal.  Urinalysis no evidence of dehydration or infectious process. CT abdomen and pelvis without acute pathologic findings. Patient will be discharged with recommendations for continued GI follow-up. I discussed return precautions and recommendations for follow-up otherwise and she is in agreement and comfortable discharge. A discussion was had with Ms. Libby Heredia regarding abdominal pain, ED findings and recommendations for follow-up.   Risk management discussed and shared decision making had with patient and/or surrogate. All questions were answered. Patient will follow up with PCP and GI within a week for further evaluation/treatment. All questions answered. Patient will return to ED for new/worsening symptoms.     Patient was sent home with a prescription for Zofran and Ultram.    MDM  Results for orders placed or performed during the hospital encounter of 11/05/20   CBC Auto Differential   Result Value Ref Range    WBC 13.0 (H) 4.0 - 11.0 K/uL    RBC 4.54 4.00 - 5.20 M/uL    Hemoglobin 14.4 12.0 - 16.0 g/dL    Hematocrit 42.1 36.0 - 48.0 %    MCV 92.7 80.0 - 100.0 fL    MCH 31.7 26.0 - 34.0 pg    MCHC 34.1 31.0 - 36.0 g/dL    RDW 12.3 (L) 12.4 - 15.4 %    Platelets 970 (H) 270 - 450 K/uL    MPV 6.5 5.0 - 10.5 fL    Neutrophils % 71.0 %    Lymphocytes % 22.5 %    Monocytes % 4.6 %    Eosinophils % 1.7 %    Basophils % 0.2 %    Neutrophils Absolute 9.2 (H) 1.7 - 7.7 K/uL    Lymphocytes Absolute 2.9 1.0 - 5.1 K/uL    Monocytes Absolute 0.6 0.0 - 1.3 K/uL    Eosinophils Absolute 0.2 0.0 - 0.6 K/uL    Basophils Absolute 0.0 0.0 - 0.2 K/uL   Comprehensive Metabolic Panel   Result Value Ref Range    Sodium 137 136 - 145 mmol/L    Potassium 3.6 3.5 - 5.1 mmol/L    Chloride 105 99 - 110 mmol/L    CO2 21 21 - 32 mmol/L    Anion Gap 11 3 - 16    Glucose 142 (H) 70 - 99 mg/dL    BUN 14 7 - 20 mg/dL    CREATININE 0.6 0.6 - 1.1 mg/dL    GFR Non-African American >60 >60    GFR African American >60 >60    Calcium 9.5 8.3 - 10.6 mg/dL    Total Protein 7.1 6.4 - 8.2 g/dL    Alb 4.4 3.4 - 5.0 g/dL    Albumin/Globulin Ratio 1.6 1.1 - 2.2    Total Bilirubin <0.2 0.0 - 1.0 mg/dL    Alkaline Phosphatase 55 40 - 129 U/L    ALT 18 10 - 40 U/L    AST 14 (L) 15 - 37 U/L    Globulin 2.7 g/dL   HCG Qualitative, Serum   Result Value Ref Range    hCG Qual Negative Detects HCG level >10 MIU/mL   Lipase   Result Value Ref Range    Lipase 28.0 13.0 - 60.0 U/L   Urinalysis   Result Value Ref Range    Color, UA Yellow Straw/Yellow Clarity, UA Clear Clear    Glucose, Ur Negative Negative mg/dL    Bilirubin Urine Negative Negative    Ketones, Urine Negative Negative mg/dL    Specific Gravity, UA 1.010 1.005 - 1.030    Blood, Urine TRACE-INTACT (A) Negative    pH, UA 6.5 5.0 - 8.0    Protein, UA Negative Negative mg/dL    Urobilinogen, Urine 0.2 <2.0 E.U./dL    Nitrite, Urine Negative Negative    Leukocyte Esterase, Urine Negative Negative    Microscopic Examination YES     Urine Type NotGiven    Microscopic Urinalysis   Result Value Ref Range    WBC, UA 0-2 0 - 5 /HPF    RBC, UA 0-2 0 - 4 /HPF    Epithelial Cells, UA 2-5 0 - 5 /HPF     I estimate there is LOW risk for ACUTE APPENDICITIS, BOWEL OBSTRUCTION, CHOLECYSTITIS, DIVERTICULITIS, INCARCERATED HERNIA, PANCREATITIS, or PERFORATED BOWEL or ULCER, thus I consider the discharge disposition reasonable. Also, there is no evidence or peritonitis, sepsis, or toxicity. Essence Roca and I have discussed the diagnosis and risks, and we agree with discharging home to follow-up with their primary doctor. We also discussed returning to the Emergency Department immediately if new or worsening symptoms occur. We have discussed the symptoms which are most concerning (e.g., bloody stool, fever, changing or worsening pain, vomiting) that necessitate immediate return. FINAL Impression  1. Generalized abdominal pain      Blood pressure 107/77, pulse 70, temperature 97.8 °F (36.6 °C), temperature source Oral, resp. rate 16, height 5' 3\" (1.6 m), weight 175 lb (79.4 kg), SpO2 97 %, not currently breastfeeding. DISPOSITION  Patient was discharged to home in good condition.          Luis F Segura  11/06/20 104

## 2021-01-23 ENCOUNTER — HOSPITAL ENCOUNTER (EMERGENCY)
Age: 36
Discharge: HOME | End: 2021-01-23
Attending: EMERGENCY MEDICINE
Payer: COMMERCIAL

## 2021-01-23 ENCOUNTER — APPOINTMENT (OUTPATIENT)
Dept: GENERAL RADIOLOGY | Age: 36
End: 2021-01-23
Payer: COMMERCIAL

## 2021-01-23 VITALS
DIASTOLIC BLOOD PRESSURE: 84 MMHG | HEIGHT: 62 IN | BODY MASS INDEX: 32.76 KG/M2 | SYSTOLIC BLOOD PRESSURE: 117 MMHG | TEMPERATURE: 98.2 F | OXYGEN SATURATION: 100 % | WEIGHT: 178 LBS | RESPIRATION RATE: 16 BRPM | HEART RATE: 82 BPM

## 2021-01-23 DIAGNOSIS — R06.02 SHORTNESS OF BREATH: ICD-10-CM

## 2021-01-23 DIAGNOSIS — R07.9 CHEST PAIN, UNSPECIFIED TYPE: Primary | ICD-10-CM

## 2021-01-23 DIAGNOSIS — Z20.822 SUSPECTED COVID-19 VIRUS INFECTION: ICD-10-CM

## 2021-01-23 LAB
EKG ATRIAL RATE: 74 BPM
EKG DIAGNOSIS: NORMAL
EKG P AXIS: 47 DEGREES
EKG P-R INTERVAL: 158 MS
EKG Q-T INTERVAL: 394 MS
EKG QRS DURATION: 90 MS
EKG QTC CALCULATION (BAZETT): 437 MS
EKG R AXIS: 57 DEGREES
EKG T AXIS: 40 DEGREES
EKG VENTRICULAR RATE: 74 BPM

## 2021-01-23 PROCEDURE — 71045 X-RAY EXAM CHEST 1 VIEW: CPT

## 2021-01-23 PROCEDURE — 93005 ELECTROCARDIOGRAM TRACING: CPT | Performed by: EMERGENCY MEDICINE

## 2021-01-23 PROCEDURE — 99284 EMERGENCY DEPT VISIT MOD MDM: CPT

## 2021-01-23 PROCEDURE — 93010 ELECTROCARDIOGRAM REPORT: CPT | Performed by: INTERNAL MEDICINE

## 2021-01-23 PROCEDURE — 6370000000 HC RX 637 (ALT 250 FOR IP): Performed by: EMERGENCY MEDICINE

## 2021-01-23 RX ORDER — PREDNISONE 10 MG/1
40 TABLET ORAL DAILY
Qty: 16 TABLET | Refills: 0 | Status: SHIPPED | OUTPATIENT
Start: 2021-01-23 | End: 2021-01-24

## 2021-01-23 RX ORDER — ALBUTEROL SULFATE 90 UG/1
2 AEROSOL, METERED RESPIRATORY (INHALATION) EVERY 4 HOURS PRN
Qty: 1 INHALER | Refills: 0 | Status: SHIPPED | OUTPATIENT
Start: 2021-01-23 | End: 2022-01-23

## 2021-01-23 RX ORDER — ACETAMINOPHEN 325 MG/1
650 TABLET ORAL ONCE
Status: COMPLETED | OUTPATIENT
Start: 2021-01-23 | End: 2021-01-23

## 2021-01-23 RX ORDER — IBUPROFEN 800 MG/1
800 TABLET ORAL EVERY 8 HOURS PRN
Qty: 21 TABLET | Refills: 0 | Status: SHIPPED | OUTPATIENT
Start: 2021-01-23 | End: 2021-09-15

## 2021-01-23 RX ORDER — IBUPROFEN 800 MG/1
800 TABLET ORAL ONCE
Status: COMPLETED | OUTPATIENT
Start: 2021-01-23 | End: 2021-01-23

## 2021-01-23 RX ORDER — PREDNISONE 20 MG/1
40 TABLET ORAL ONCE
Status: COMPLETED | OUTPATIENT
Start: 2021-01-23 | End: 2021-01-23

## 2021-01-23 RX ADMIN — IBUPROFEN 800 MG: 800 TABLET, FILM COATED ORAL at 05:41

## 2021-01-23 RX ADMIN — PREDNISONE 40 MG: 20 TABLET ORAL at 05:41

## 2021-01-23 RX ADMIN — ACETAMINOPHEN 650 MG: 325 TABLET ORAL at 05:41

## 2021-01-23 ASSESSMENT — PAIN SCALES - GENERAL
PAINLEVEL_OUTOF10: 6
PAINLEVEL_OUTOF10: 6

## 2021-01-23 NOTE — ED PROVIDER NOTES
CHIEF COMPLAINT  Shortness of Breath (shortness of breath started two hours ago, Chest pain with back and stomach pain, chest pains wax and wan every 15 min, Rapid covid negative at urgant care they did a send out test due to her being around it 4 days ago, )      HISTORY OF PRESENT ILLNESS  April REMY Gibbons is a 38 y.o. female who presents to the ED with shortness of breath and chest pains are couple hours ago she is also been having some upset stomach as well as body aches in her neck and her joints and arms and back. She has been exposed to Covid from a coworker tested positive yesterday she had a negative rapid yesterday but a PCR was sent out from an urgent care that she has not gotten the results back on yet. She has not taken anything for the symptoms and nothing really makes any better or worse. No nausea vomiting or any headache or dizziness or any numbness or tingling. No other complaints, modifying factors or associated symptoms. I have reviewed the following from the nursing documentation.     Past Medical History:   Diagnosis Date    Anxiety     Asthma     Ectopic pregnancy     Enlarged pituitary gland ([facility] [address] 01/2019    Heart abnormalities     heart palpitations in early pregnancy     Past Surgical History:   Procedure Laterality Date    CHOLECYSTECTOMY  02/16/2017    LAPAROSCOPIC CHOLECYSTECTOMY WITH CHOLANGIOGRAMS    COLONOSCOPY  5/19/14    normal    COLONOSCOPY N/A 8/27/2019    COLONOSCOPY WITH BIOPSY performed by Cinda Caballero DO at [facility] / BIOPSY SKIN LESION OF ARM Right 3/12/2019    EXCISION FOREARM LIPOMA TIMES TWO performed by Clyde Fernandes MD at [facility] Right 03/12/2019    EXCISION FOREARM LIPOMA TIMES TWO (Right Arm Lower)    WISDOM TOOTH EXTRACTION      3 yrs ago     Family History   Problem Relation Age of Onset    Diabetes Mother     High Cholesterol Mother     High Blood Pressure Mother     Heart Disease Mother     Cancer Maternal Aunt     Heart Disease Maternal Uncle     High Cholesterol Paternal Aunt     Heart Disease Paternal Aunt     High Cholesterol Paternal Uncle     Heart Disease Paternal Uncle     High Cholesterol Maternal Grandmother     Heart Disease Maternal Grandmother     High Cholesterol Maternal Grandfather     Heart Disease Maternal Grandfather      Social History     Socioeconomic History    Marital status:      Spouse name: Not on file    Number of children: Not on file    Years of education: Not on file    Highest education level: Not on file   Occupational History    Not on file   Social Needs    Financial resource strain: Not on file    Food insecurity     Worry: Not on file     Inability: Not on file    Transportation needs     Medical: Not on file     Non-medical: Not on file   Tobacco Use    Smoking status: Current Every Day Smoker     Packs/day: 1.00     Years: 3.00     Pack years: 3.00     Types: Cigarettes    Smokeless tobacco: Never Used    Tobacco comment: Vapor    Substance and Sexual Activity    Alcohol use: No    Drug use: No    Sexual activity: Yes     Partners: Male   Lifestyle    Physical activity     Days per week: Not on file     Minutes per session: Not on file    Stress: Not on file   Relationships    Social connections     Talks on phone: Not on file     Gets together: Not on file     Attends Judaism service: Not on file     Active member of club or organization: Not on file     Attends meetings of clubs or organizations: Not on file     Relationship status: Not on file    Intimate partner violence     Fear of current or ex partner: Not on file     Emotionally abused: Not on file     Physically abused: Not on file     Forced sexual activity: Not on file   Other Topics Concern    Not on file   Social History Narrative    Not on file     No current facility-administered medications for this encounter.       Current Outpatient Medications Medication Sig Dispense Refill    ondansetron (ZOFRAN ODT) 4 MG disintegrating tablet Take 1 tablet by mouth every 8 hours as needed for Nausea 15 tablet 0    Probiotic Product (PRO-BIOTIC BLEND PO) Take by mouth      Topiramate (TOPAMAX PO) Take by mouth      Cetirizine HCl (ZYRTEC ALLERGY PO) Take by mouth       Allergies   Allergen Reactions    Pcn [Penicillins] Hives and Rash       REVIEW OF SYSTEMS  10 systems reviewed, pertinent positives per HPI otherwise noted to be negative. PHYSICAL EXAM  /86   Pulse 80   Temp 98.2 °F (36.8 °C) (Oral)   Resp 18   Ht 5' 2\" (1.575 m)   Wt 178 lb (80.7 kg)   SpO2 98%   BMI 32.56 kg/m²   GENERAL APPEARANCE: Awake and alert. Cooperative. No acute distress. Appears nontoxic. HEAD: Normocephalic. Atraumatic. EYES: PERRL. EOM's grossly intact. ENT: Mucous membranes are moist.   NECK: Supple. No midline tenderness, no meningismus. HEART: RRR. CHEST/LUNGS: Chest atraumatic, nontender, respirations unlabored. CTAB. Good air exchange. Speaking comfortably in full sentences. BACK: No midline spinal tenderness or step-off. No meningismus. ABDOMEN: Soft. Non-distended. Non-tender. No guarding or rebound. Normal bowel sounds. EXTREMITIES: No peripheral edema. Moves all extremities equally. All extremities neurovascularly intact. RECTAL/: Deferred  SKIN: Warm and dry. No acute rashes. NEUROLOGICAL: Alert and orientedx4. CN 2-12 intact, No gross facial drooping. Strength 5/5, sensation intact. Normal coordination. Gait normal.   PSYCHIATRIC: Normal mood and affect. EKG  EKG interpreted by me. Normal sinus rhythm with normal intervals and normal QRS, normal axis, no T wave inversions, No significant ST elevation or depression. RADIOLOGY  X-RAYS:  I have reviewed radiologic plain film image(s). ALL OTHER NON-PLAIN FILM IMAGES SUCH AS CT, ULTRASOUND AND MRI HAVE BEEN READ BY THE RADIOLOGIST.   XR CHEST PORTABLE    (Results Pending) Austyn Menendez,   01/23/21 [id number]

## 2021-01-24 ENCOUNTER — HOSPITAL ENCOUNTER (EMERGENCY)
Age: 36
Discharge: HOME | End: 2021-01-24
Attending: EMERGENCY MEDICINE
Payer: COMMERCIAL

## 2021-01-24 ENCOUNTER — APPOINTMENT (OUTPATIENT)
Dept: GENERAL RADIOLOGY | Age: 36
End: 2021-01-24
Payer: COMMERCIAL

## 2021-01-24 VITALS
HEIGHT: 64 IN | HEART RATE: 74 BPM | SYSTOLIC BLOOD PRESSURE: 120 MMHG | DIASTOLIC BLOOD PRESSURE: 86 MMHG | RESPIRATION RATE: 18 BRPM | OXYGEN SATURATION: 98 % | BODY MASS INDEX: 29.88 KG/M2 | TEMPERATURE: 98.2 F | WEIGHT: 175 LBS

## 2021-01-24 DIAGNOSIS — R06.00 DYSPNEA, UNSPECIFIED TYPE: Primary | ICD-10-CM

## 2021-01-24 DIAGNOSIS — R51.9 NONINTRACTABLE HEADACHE, UNSPECIFIED CHRONICITY PATTERN, UNSPECIFIED HEADACHE TYPE: ICD-10-CM

## 2021-01-24 LAB
A/G RATIO: 1.6 (ref 1.1–2.2)
ALBUMIN SERPL-MCNC: 4.1 G/DL (ref 3.4–5)
ALP BLD-CCNC: 52 U/L (ref 40–129)
ALT SERPL-CCNC: 16 U/L (ref 10–40)
ANION GAP SERPL CALCULATED.3IONS-SCNC: 9 MMOL/L (ref 3–16)
AST SERPL-CCNC: 11 U/L (ref 15–37)
BASOPHILS ABSOLUTE: 0 K/UL (ref 0–0.2)
BASOPHILS RELATIVE PERCENT: 0.3 %
BILIRUB SERPL-MCNC: <0.2 MG/DL (ref 0–1)
BUN BLDV-MCNC: 15 MG/DL (ref 7–20)
CALCIUM SERPL-MCNC: 9.1 MG/DL (ref 8.3–10.6)
CHLORIDE BLD-SCNC: 108 MMOL/L (ref 99–110)
CO2: 19 MMOL/L (ref 21–32)
CREAT SERPL-MCNC: 0.7 MG/DL (ref 0.6–1.1)
D DIMER: <200 NG/ML DDU (ref 0–229)
EKG ATRIAL RATE: 84 BPM
EKG DIAGNOSIS: NORMAL
EKG P AXIS: 51 DEGREES
EKG P-R INTERVAL: 154 MS
EKG Q-T INTERVAL: 374 MS
EKG QRS DURATION: 90 MS
EKG QTC CALCULATION (BAZETT): 441 MS
EKG R AXIS: 64 DEGREES
EKG T AXIS: 48 DEGREES
EKG VENTRICULAR RATE: 84 BPM
EOSINOPHILS ABSOLUTE: 0.1 K/UL (ref 0–0.6)
EOSINOPHILS RELATIVE PERCENT: 1.1 %
GFR AFRICAN AMERICAN: >60
GFR NON-AFRICAN AMERICAN: >60
GLOBULIN: 2.5 G/DL
GLUCOSE BLD-MCNC: 107 MG/DL (ref 70–99)
HCG QUALITATIVE: NEGATIVE
HCT VFR BLD CALC: 40.6 % (ref 36–48)
HEMOGLOBIN: 13.7 G/DL (ref 12–16)
LYMPHOCYTES ABSOLUTE: 3.3 K/UL (ref 1–5.1)
LYMPHOCYTES RELATIVE PERCENT: 34.8 %
MCH RBC QN AUTO: 31.6 PG (ref 26–34)
MCHC RBC AUTO-ENTMCNC: 33.7 G/DL (ref 31–36)
MCV RBC AUTO: 93.6 FL (ref 80–100)
MONOCYTES ABSOLUTE: 0.6 K/UL (ref 0–1.3)
MONOCYTES RELATIVE PERCENT: 6.5 %
NEUTROPHILS ABSOLUTE: 5.4 K/UL (ref 1.7–7.7)
NEUTROPHILS RELATIVE PERCENT: 57.3 %
PDW BLD-RTO: 12.9 % (ref 12.4–15.4)
PLATELET # BLD: 404 K/UL (ref 135–450)
PMV BLD AUTO: 6.6 FL (ref 5–10.5)
POTASSIUM SERPL-SCNC: 3.4 MMOL/L (ref 3.5–5.1)
RBC # BLD: 4.34 M/UL (ref 4–5.2)
SARS-COV-2, NAAT: NOT DETECTED
SODIUM BLD-SCNC: 136 MMOL/L (ref 136–145)
TOTAL PROTEIN: 6.6 G/DL (ref 6.4–8.2)
TROPONIN: <0.01 NG/ML
WBC # BLD: 9.5 K/UL (ref 4–11)

## 2021-01-24 PROCEDURE — 85025 COMPLETE CBC W/AUTO DIFF WBC: CPT

## 2021-01-24 PROCEDURE — 85379 FIBRIN DEGRADATION QUANT: CPT

## 2021-01-24 PROCEDURE — 96375 TX/PRO/DX INJ NEW DRUG ADDON: CPT

## 2021-01-24 PROCEDURE — 93010 ELECTROCARDIOGRAM REPORT: CPT | Performed by: INTERNAL MEDICINE

## 2021-01-24 PROCEDURE — 6360000002 HC RX W HCPCS: Performed by: EMERGENCY MEDICINE

## 2021-01-24 PROCEDURE — 71045 X-RAY EXAM CHEST 1 VIEW: CPT

## 2021-01-24 PROCEDURE — 96374 THER/PROPH/DIAG INJ IV PUSH: CPT

## 2021-01-24 PROCEDURE — 84703 CHORIONIC GONADOTROPIN ASSAY: CPT

## 2021-01-24 PROCEDURE — 84484 ASSAY OF TROPONIN QUANT: CPT

## 2021-01-24 PROCEDURE — 80053 COMPREHEN METABOLIC PANEL: CPT

## 2021-01-24 PROCEDURE — 99285 EMERGENCY DEPT VISIT HI MDM: CPT

## 2021-01-24 PROCEDURE — 93005 ELECTROCARDIOGRAM TRACING: CPT | Performed by: EMERGENCY MEDICINE

## 2021-01-24 RX ORDER — KETOROLAC TROMETHAMINE 30 MG/ML
15 INJECTION, SOLUTION INTRAMUSCULAR; INTRAVENOUS ONCE
Status: COMPLETED | OUTPATIENT
Start: 2021-01-24 | End: 2021-01-24

## 2021-01-24 RX ORDER — METOCLOPRAMIDE HYDROCHLORIDE 5 MG/ML
10 INJECTION INTRAMUSCULAR; INTRAVENOUS ONCE
Status: COMPLETED | OUTPATIENT
Start: 2021-01-24 | End: 2021-01-24

## 2021-01-24 RX ORDER — DEXAMETHASONE SODIUM PHOSPHATE 10 MG/ML
10 INJECTION, SOLUTION INTRAMUSCULAR; INTRAVENOUS ONCE
Status: COMPLETED | OUTPATIENT
Start: 2021-01-24 | End: 2021-01-24

## 2021-01-24 RX ORDER — DIPHENHYDRAMINE HYDROCHLORIDE 50 MG/ML
50 INJECTION INTRAMUSCULAR; INTRAVENOUS ONCE
Status: COMPLETED | OUTPATIENT
Start: 2021-01-24 | End: 2021-01-24

## 2021-01-24 RX ADMIN — DEXAMETHASONE SODIUM PHOSPHATE 10 MG: 10 INJECTION INTRAMUSCULAR; INTRAVENOUS at 04:27

## 2021-01-24 RX ADMIN — DIPHENHYDRAMINE HYDROCHLORIDE 50 MG: 50 INJECTION, SOLUTION INTRAMUSCULAR; INTRAVENOUS at 04:27

## 2021-01-24 RX ADMIN — METOCLOPRAMIDE 10 MG: 5 INJECTION, SOLUTION INTRAMUSCULAR; INTRAVENOUS at 04:27

## 2021-01-24 RX ADMIN — KETOROLAC TROMETHAMINE 15 MG: 30 INJECTION, SOLUTION INTRAMUSCULAR at 04:27

## 2021-01-24 ASSESSMENT — PAIN SCALES - GENERAL
PAINLEVEL_OUTOF10: 1
PAINLEVEL_OUTOF10: 4

## 2021-01-24 NOTE — ED NOTES
Bed: 10  Expected date: 1/24/21  Expected time:   Means of arrival:   Comments:  EMS     Ashwini Choi  01/24/21 0324

## 2021-01-24 NOTE — ED PROVIDER NOTES
[address] ED      CHIEF COMPLAINT  Shortness of Breath (Pt was tested for covid yesterday ( neg) but co of chest pain and \" fast heart beat\". Patient states that her head feels like it is on fire. )       HISTORY OF PRESENT ILLNESS  April REMY Mcgrath is a 38 y.o. female  who presents to the ED for evaluation of chest pain, shortness of breath, and headache. Patient reports having intermittent chest pain, shortness of breath, and body aches. Patient reports having a Covid swab that was negative. As she was trying to fall asleep, patient says she developed worsening shortness of breath and chest pain. Also reports having a headache. Reports having history of migraines and takes topiramate but this feels different from her migraine and that it feels worse than normal.  Rates her pain as a 7 out of 10. Describes the sensation as head feeling like it is on fire. Reports the headache is global.  Denies having any neck pain. Denies vision changes. Says she feels like her headache causes her to speak more slowly than normal.  However, patient is answering questions appropriately. Denies having any weakness of arms, legs. No facial droop present. No other complaints, modifying factors or associated symptoms. I have reviewed the following from the nursing documentation.     Past Medical History:   Diagnosis Date    Anxiety     Asthma     Ectopic pregnancy     Enlarged pituitary gland ([facility] [address] 01/2019    Heart abnormalities     heart palpitations in early pregnancy     Past Surgical History:   Procedure Laterality Date    CHOLECYSTECTOMY  02/16/2017    LAPAROSCOPIC CHOLECYSTECTOMY WITH CHOLANGIOGRAMS    COLONOSCOPY  5/19/14    normal    COLONOSCOPY N/A 8/27/2019    COLONOSCOPY WITH BIOPSY performed by Rita Bangura DO at [facility] / BIOPSY SKIN LESION OF ARM Right 3/12/2019    EXCISION FOREARM LIPOMA TIMES TWO performed by Shonna Mayfield MD at [address] [place] [address], [address] SURGICAL HISTORY Right 03/12/2019    EXCISION FOREARM LIPOMA TIMES TWO (Right Arm Lower)    WISDOM TOOTH EXTRACTION      3 yrs ago     Family History   Problem Relation Age of Onset    Diabetes Mother     High Cholesterol Mother     High Blood Pressure Mother     Heart Disease Mother     Cancer Maternal Aunt     Heart Disease Maternal Uncle     High Cholesterol Paternal Aunt     Heart Disease Paternal Aunt     High Cholesterol Paternal Uncle     Heart Disease Paternal Uncle     High Cholesterol Maternal Grandmother     Heart Disease Maternal Grandmother     High Cholesterol Maternal Grandfather     Heart Disease Maternal Grandfather      Social History     Socioeconomic History    Marital status:      Spouse name: Not on file    Number of children: Not on file    Years of education: Not on file    Highest education level: Not on file   Occupational History    Not on file   Social Needs    Financial resource strain: Not on file    Food insecurity     Worry: Not on file     Inability: Not on file    Transportation needs     Medical: Not on file     Non-medical: Not on file   Tobacco Use    Smoking status: Current Every Day Smoker     Packs/day: 1.00     Years: 3.00     Pack years: 3.00     Types: Cigarettes    Smokeless tobacco: Never Used    Tobacco comment: Vapor    Substance and Sexual Activity    Alcohol use: No    Drug use: Yes     Types: Marijuana     Comment: Used today for the first time in a year    Sexual activity: Yes     Partners: Male   Lifestyle    Physical activity     Days per week: Not on file     Minutes per session: Not on file    Stress: Not on file   Relationships    Social connections     Talks on phone: Not on file     Gets together: Not on file     Attends Scientologist service: Not on file     Active member of club or organization: Not on file     Attends meetings of clubs or organizations: Not on file     Relationship status: Not on file    Intimate partner violence     Fear of current or ex partner: Not on file     Emotionally abused: Not on file     Physically abused: Not on file     Forced sexual activity: Not on file   Other Topics Concern    Not on file   Social History Narrative    Not on file     No current facility-administered medications for this encounter. Current Outpatient Medications   Medication Sig Dispense Refill    ibuprofen (ADVIL;MOTRIN) 800 MG tablet Take 1 tablet by mouth every 8 hours as needed for Pain 21 tablet 0    albuterol sulfate HFA (PROVENTIL HFA) 108 (90 Base) MCG/ACT inhaler Inhale 2 puffs into the lungs every 4 hours as needed for Wheezing 1 Inhaler 0    ondansetron (ZOFRAN ODT) 4 MG disintegrating tablet Take 1 tablet by mouth every 8 hours as needed for Nausea 15 tablet 0    Probiotic Product (PRO-BIOTIC BLEND PO) Take by mouth      Topiramate (TOPAMAX PO) Take by mouth       Allergies   Allergen Reactions    Pcn [Penicillins] Hives and Rash       REVIEW OF SYSTEMS  10 systems reviewed, pertinent positives per HPI otherwise noted to be negative. PHYSICAL EXAM  /86   Pulse 74   Temp 98.2 °F (36.8 °C) (Oral)   Resp 18   Ht 5' 4\" (1.626 m)   Wt 175 lb (79.4 kg)   SpO2 98%   BMI 30.04 kg/m²    GENERAL APPEARANCE: Awake and alert. HENT: Normocephalic. Atraumatic. PERRL. EOMI. No facial droop. HEART/CHEST: RRR. LUNGS: Respirations unlabored. Speaking comfortably in full sentences. CTAB. ABDOMEN: Soft, non-distended abdomen. Non tender to palpation. No guarding. No rebound. EXTREMITIES: No gross deformities. Moving all extremities. SKIN: Warm and dry. No acute rashes. NEUROLOGICAL: Alert and oriented. No gross facial drooping. Answering questions appropriately. Moving all extremities.     LABS  Results for orders placed or performed during the hospital encounter of 01/24/21   CBC Auto Differential   Result Value Ref Range    WBC 9.5 4.0 - 11.0 K/uL    RBC 4.34 4.00 - 5.20 M/uL    Hemoglobin 13.7 12.0 - 16.0 g/dL    Hematocrit 40.6 36.0 - 48.0 %    MCV 93.6 80.0 - 100.0 fL    MCH 31.6 26.0 - 34.0 pg    MCHC 33.7 31.0 - 36.0 g/dL    RDW 12.9 12.4 - 15.4 %    Platelets 021 821 - 853 K/uL    MPV 6.6 5.0 - 10.5 fL    Neutrophils % 57.3 %    Lymphocytes % 34.8 %    Monocytes % 6.5 %    Eosinophils % 1.1 %    Basophils % 0.3 %    Neutrophils Absolute 5.4 1.7 - 7.7 K/uL    Lymphocytes Absolute 3.3 1.0 - 5.1 K/uL    Monocytes Absolute 0.6 0.0 - 1.3 K/uL    Eosinophils Absolute 0.1 0.0 - 0.6 K/uL    Basophils Absolute 0.0 0.0 - 0.2 K/uL   Comprehensive Metabolic Panel   Result Value Ref Range    Sodium 136 136 - 145 mmol/L    Potassium 3.4 (L) 3.5 - 5.1 mmol/L    Chloride 108 99 - 110 mmol/L    CO2 19 (L) 21 - 32 mmol/L    Anion Gap 9 3 - 16    Glucose 107 (H) 70 - 99 mg/dL    BUN 15 7 - 20 mg/dL    CREATININE 0.7 0.6 - 1.1 mg/dL    GFR Non-African American >60 >60    GFR African American >60 >60    Calcium 9.1 8.3 - 10.6 mg/dL    Total Protein 6.6 6.4 - 8.2 g/dL    Alb 4.1 3.4 - 5.0 g/dL    Albumin/Globulin Ratio 1.6 1.1 - 2.2    Total Bilirubin <0.2 0.0 - 1.0 mg/dL    Alkaline Phosphatase 52 40 - 129 U/L    ALT 16 10 - 40 U/L    AST 11 (L) 15 - 37 U/L    Globulin 2.5 g/dL   HCG Qualitative, Serum   Result Value Ref Range    hCG Qual Negative Detects HCG level >10 MIU/mL   D-Dimer, Quantitative   Result Value Ref Range    D-Dimer, Quant <200 0 - 229 ng/mL DDU   COVID-19   Result Value Ref Range    SARS-CoV-2, NAAT Not Detected Not Detected   Troponin   Result Value Ref Range    Troponin <0.01 <0.01 ng/mL   EKG 12 Lead   Result Value Ref Range    Ventricular Rate 84 BPM    Atrial Rate 84 BPM    P-R Interval 154 ms    QRS Duration 90 ms    Q-T Interval 374 ms    QTc Calculation (Bazett) 441 ms    P Axis 51 degrees    R Axis 64 degrees    T Axis 48 degrees    Diagnosis       Normal sinus rhythmNormal ECGWhen compared with ECG of 23-JAN-2021 05:16,No significant change was foundConfirmed by ANIVAL WHITEHEAD, CECY ([id number]) on 1/24/2021 7:30:28 AM       I have reviewed all labs for this visit. ECG  The Ekg interpreted by me shows  Normal sinus rhythm with a rate of 84  Axis is normal  QTc is normal  Intervals and Durations are unremarkable. ST Segments: Nonspecific ST changes  No significant change from prior EKG dated 01/23/20    RADIOLOGY  Xr Chest Portable    Result Date: 1/24/2021  EXAMINATION: ONE XRAY VIEW OF THE CHEST 1/24/2021 4:17 am COMPARISON: 01/23/2021 HISTORY: ORDERING SYSTEM PROVIDED HISTORY: other TECHNOLOGIST PROVIDED HISTORY: Reason for exam:->other Reason for Exam: SOB Acuity: Acute Type of Exam: Initial Additional signs and symptoms: SOB  today, denies fever FINDINGS: Heart size and pulmonary vasculature are normal.  The lungs are clear and normally expanded. Surrounding osseous and soft tissue structures are unremarkable. Normal examination. Xr Chest Portable    Result Date: 1/23/2021  EXAMINATION: ONE XRAY VIEW OF THE CHEST 1/23/2021 5:18 am COMPARISON: Chest two views October 10, 2018 HISTORY: ORDERING SYSTEM PROVIDED HISTORY: cp TECHNOLOGIST PROVIDED HISTORY: Reason for exam:->cp Reason for Exam: chest pain Acuity: Unknown Type of Exam: Unknown FINDINGS: The heart is normal in size and configuration. The mediastinal contours are within normal limits. The lungs are well aerated. The pleural surfaces are normal and no evidence of a pleural effusion is seen. Bones and soft tissues are unremarkable. Unremarkable single AP portable view of the chest.     ED COURSE/MDM  Patient seen and evaluated. At presentation, patient was awake, alert, afebrile, hemodynamically stable, and satting well on room air. Patient was speaking slowly, however was oriented x3, answering qu/estions appropriately, following commands, and moving all extremities. Pupils were equal round and reactive, EOMI, no facial droop present. Differential diagnosis includes COVID-19, arrhythmia, ACS, PE, among others.   Given this, CBC, CMP, troponin, D-dimer obtained. COVID-19 was negative. Creatinine 0.7. Pregnancy test negative. D-dimer less than 200. Troponin less than 0.01. Work-up discussed with patient. Patient denied having any head injuries. Discussed ct head versus migraine cocktail and patient wanted to try the migraine cocktail. Patient given Toradol, Reglan, and Benadryl. Per chart review, patient had a another visit to the emergency department yesterday and was discharged home. Patient instructed to follow-up with PCP for further evaluation of the chest pain that she has been experiencing. At time of discharge, patient's headache was a 1 out of 10 in severity and reported complete resolution of chest pain. Patient discharged home with strict return precautions. Pt was seen during the [place] 19 pandemic. Appropriate PPE worn by ME during patient encounters. Pt seen during a time with constrained hospital bed capacity and other potential inpatient and outpatient resources were constrained due to the viral pandemic. During the patient's ED course, the patient was given:  Medications   ketorolac (TORADOL) injection 15 mg (15 mg Intravenous Given 1/24/21 0427)   diphenhydrAMINE (BENADRYL) injection 50 mg (50 mg Intravenous Given 1/24/21 0427)   metoclopramide (REGLAN) injection 10 mg (10 mg Intravenous Given 1/24/21 0427)   dexamethasone (PF) (DECADRON) injection 10 mg (10 mg Intravenous Given 1/24/21 0427)        CLINICAL IMPRESSION  1. Dyspnea, unspecified type    2. Nonintractable headache, unspecified chronicity pattern, unspecified headache type        Blood pressure 120/86, pulse 74, temperature 98.2 °F (36.8 °C), temperature source Oral, resp. rate 18, height 5' 4\" (1.626 m), weight 175 lb (79.4 kg), SpO2 98 %, not currently breastfeeding. DISPOSITION  April A Bobo Lopez was discharged home in stable condition. Patient was given scripts for the following medications.  I counseled patient how to take these medications. Discharge Medication List as of 1/24/2021  7:16 AM          Follow-up with:  Ambika Reynoso, APRN - CNP  [address] [facility] [address] [facility]  [phone]    In 2 days        DISCLAIMER: This chart was created using Dragon dictation software. Efforts were made by me to ensure accuracy, however some errors may be present due to limitations of this technology and occasionally words are not transcribed correctly.         Yuni Jimenes MD  01/24/21 2021

## 2021-01-24 NOTE — ED NOTES
Patient being discharged home, discharge instructions printed and reviewed with patient, denies any questions. IV removed cath intact sterile 2x2 applied with pressure dressing. Patient ambulated off unit with no signs of distress.      David Concepcion RN  01/24/21 6623

## 2021-01-27 ENCOUNTER — TELEPHONE (OUTPATIENT)
Dept: CARDIOLOGY CLINIC | Age: 36
End: 2021-01-27

## 2021-01-27 NOTE — TELEPHONE ENCOUNTER
I think we may need to consider that there are noncardiac causes for the chest pain but to fully evaluate her, would recommend doing a follow-up echocardiogram and exercise nuclear stress test, she will need urine pregnancy test if not already done in the emergency room. If those tests are negative, then I think she will need to continue to follow-up with the primary care physician and consider alternate etiologies for chest pain that could originate from the GI tract or pulmonary system. Thank you.

## 2021-01-27 NOTE — TELEPHONE ENCOUNTER
Last OV with [facility] 3/29/19      Spoke with patient concerning chest pain. She reports she has been evaluated in the ED x2 over the weekend for this and was d/c home with negative cardiac evaluation. She reports she is still having the chest tightness with lightheadedness and shortness of breath that radiates into her left shoulder. Reports she was evaluated by PCP for this today. Has OV with [facility] 2/4/21. Patient wants to know if there is anything that she can do prior to her OV with [facility] or if she can be seen sooner?

## 2021-01-28 DIAGNOSIS — R06.02 SOB (SHORTNESS OF BREATH): ICD-10-CM

## 2021-01-28 DIAGNOSIS — R07.2 PRECORDIAL PAIN: Primary | ICD-10-CM

## 2021-01-28 NOTE — TELEPHONE ENCOUNTER
Spoke to pt. Relayed message. VU. Testing ordered. Central Scheduling number given to patient. stretcher

## 2021-02-03 ENCOUNTER — HOSPITAL ENCOUNTER (OUTPATIENT)
Dept: NEUROLOGY | Age: 36
Discharge: HOME | End: 2021-02-03
Payer: COMMERCIAL

## 2021-02-03 PROCEDURE — 95908 NRV CNDJ TST 3-4 STUDIES: CPT

## 2021-02-03 PROCEDURE — 95886 MUSC TEST DONE W/N TEST COMP: CPT

## 2021-02-03 NOTE — PROCEDURES
Electrodiagnostic Report  [address] [place] [address]  Physical Medicine & Rehabilitation    02/03/21 April Hermila Goodson  38 y.o.  1985  [id number]  Referring Provider: SIRIA Phillip    Clinical Problem:  38 y.o with history of left sided neck pain worse over past few months with radiation to shoulder radiating to hand. PMH: no endocrine disease. No neck or left arm surgery PE: Reflexes trace, normal strength    EMG SUMMARY TABLE LEFT UPPER     Spontaneous     MUAP   Recruitment    Insertional Activity Fibrillation Potential Positive Sharp Waves   Fasiculation High Frequency Potentials   Amp Duration PPP Pattern   Deltoid C5.6 Ax normal none none none none normal normal normal normal   Biceps C5,6 musc cut normal none none none none normal normal normal normal   Triceps C6,7,8 Radial normal none none none none normal normal normal normal   Pronator Teres C6,7 Median normal none none none none normal normal normal normal   Brachio Rad C5,6 Radial normal none none none none normal normal normal normal   Ext Ind Prop C7,8 Radial normal none none none none normal normal normal normal   Oppones Poll C8-T1 Median normal none none none none normal normal normal normal   1st Dorsal Int C8-T1 Ulnar normal none none none none normal normal normal normal   Cerv Paraspinals C2-T1 PPR       Diff relax none none none none normal normal normal normal     Nerve Conduction Studies     Nerve Sensory Distal Latency (msec) Sensory Distal Latency (msec) Amp uv Amp uv Motor Distal Latency (msec) Motor Distal Latency (msec) Amp kv Amp kv Motor NCV (m/sec) Motor NCV (m/sec)    Right Left Right Left Right Left Right Left Right Left   Median (n<3.6) 3.3 (n<3.6)  35 (n<4.3) 3.6 (n<4.3)  5.9  (n>50) 60 n>50)   Ulnar (n<3.7) 2.7 (n<3.7)  42 (n<4.2) 3.4 (n<4.2)  7.7   4.9 b.e(n>50)   a.e(>45) 60 b.e(n>50)  42  a.e(>45)   Radial (n<3.5) (n<3.5)             Summary of EMG and Nerve Conduction Findings:  The above EMG needle exam was within normal limits. Nerve conduction studies demonstrate prolongation of left ulnar motor conduction velocity across elbow, with conduction block with stimulation proximal to elbow. 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 No evidence of an acute radiculopathy or other entrapment neuropathy; Thank you. Electronically signed by:  Damaris Chowdhury DO,2/3/2021,9:59 AM

## 2021-02-11 ENCOUNTER — HOSPITAL ENCOUNTER (OUTPATIENT)
Dept: VASCULAR LAB | Age: 36
Discharge: HOME | End: 2021-02-11
Payer: COMMERCIAL

## 2021-02-11 PROCEDURE — 93971 EXTREMITY STUDY: CPT

## 2021-02-16 ENCOUNTER — HOSPITAL ENCOUNTER (OUTPATIENT)
Dept: CARDIOLOGY | Age: 36
Discharge: HOME | End: 2021-02-16
Payer: COMMERCIAL

## 2021-02-16 ENCOUNTER — TELEPHONE (OUTPATIENT)
Dept: CARDIOLOGY CLINIC | Age: 36
End: 2021-02-16

## 2021-02-16 ENCOUNTER — HOSPITAL ENCOUNTER (OUTPATIENT)
Age: 36
Discharge: HOME | End: 2021-02-16
Payer: COMMERCIAL

## 2021-02-16 DIAGNOSIS — R06.02 SOB (SHORTNESS OF BREATH): ICD-10-CM

## 2021-02-16 DIAGNOSIS — R07.2 PRECORDIAL PAIN: ICD-10-CM

## 2021-02-16 LAB
HCG(URINE) PREGNANCY TEST: NEGATIVE
LV EF: 55 %
LV EF: 60 %
LVEF MODALITY: NORMAL
LVEF MODALITY: NORMAL

## 2021-02-16 PROCEDURE — 93306 TTE W/DOPPLER COMPLETE: CPT

## 2021-02-16 PROCEDURE — 3430000000 HC RX DIAGNOSTIC RADIOPHARMACEUTICAL: Performed by: INTERNAL MEDICINE

## 2021-02-16 PROCEDURE — 84703 CHORIONIC GONADOTROPIN ASSAY: CPT

## 2021-02-16 PROCEDURE — A9502 TC99M TETROFOSMIN: HCPCS | Performed by: INTERNAL MEDICINE

## 2021-02-16 PROCEDURE — 93017 CV STRESS TEST TRACING ONLY: CPT

## 2021-02-16 PROCEDURE — 78452 HT MUSCLE IMAGE SPECT MULT: CPT

## 2021-02-16 RX ADMIN — TETROFOSMIN 11.9 MILLICURIE: 1.38 INJECTION, POWDER, LYOPHILIZED, FOR SOLUTION INTRAVENOUS at 10:45

## 2021-02-16 RX ADMIN — TETROFOSMIN 32.2 MILLICURIE: 1.38 INJECTION, POWDER, LYOPHILIZED, FOR SOLUTION INTRAVENOUS at 12:25

## 2021-02-16 NOTE — TELEPHONE ENCOUNTER
----- Message from Tristen Cobos MD sent at 2/16/2021  2:07 PM EST -----  Let patient know echo test shows normal heart function, no new orders or changes at this time. Thanks.

## 2021-02-17 ENCOUNTER — TELEPHONE (OUTPATIENT)
Dept: CARDIOLOGY CLINIC | Age: 36
End: 2021-02-17

## 2021-02-17 NOTE — TELEPHONE ENCOUNTER
----- Message from Jeny Canales MD sent at 2/16/2021  4:40 PM EST -----  I would recommend doing stress echo and doing heart monitor for two weeks if not already done. Thank you.

## 2021-02-22 DIAGNOSIS — R06.02 SOB (SHORTNESS OF BREATH): Primary | ICD-10-CM

## 2021-02-22 NOTE — TELEPHONE ENCOUNTER
Spoke to pt. Exercise stress test has been put in epic. Pt has been given central scheduling number. Pt is going to come in artie to get a 2 wk medi lynx put on as well.  Pt V/U.

## 2021-02-23 ENCOUNTER — NURSE ONLY (OUTPATIENT)
Dept: CARDIOLOGY CLINIC | Age: 36
End: 2021-02-23

## 2021-02-23 ENCOUNTER — TELEPHONE (OUTPATIENT)
Dept: CARDIOLOGY CLINIC | Age: 36
End: 2021-02-23

## 2021-02-23 DIAGNOSIS — R06.02 SOB (SHORTNESS OF BREATH): ICD-10-CM

## 2021-02-23 NOTE — TELEPHONE ENCOUNTER
Monitor placed by Khoa Lim, [address] [place] [address]  Length of monitor 14 days   Monitor ordered by [facility] Number [id number]    Activation successful prior to pt leaving office?  Yes

## 2021-03-19 PROCEDURE — 93272 ECG/REVIEW INTERPRET ONLY: CPT | Performed by: INTERNAL MEDICINE

## 2021-03-24 ENCOUNTER — TELEPHONE (OUTPATIENT)
Dept: CARDIOLOGY CLINIC | Age: 36
End: 2021-03-24

## 2021-03-24 DIAGNOSIS — R06.02 SOB (SHORTNESS OF BREATH): ICD-10-CM

## 2021-03-24 NOTE — TELEPHONE ENCOUNTER
Spoke to pt. Per DR Dany Brito, cardiac monitor showed SR, PACs and AIVR. Considered benign. No changes.  VU

## 2021-05-20 ENCOUNTER — HOSPITAL ENCOUNTER (EMERGENCY)
Age: 36
Discharge: HOME | End: 2021-05-21
Payer: COMMERCIAL

## 2021-05-20 DIAGNOSIS — R10.10 UPPER ABDOMINAL PAIN: Primary | ICD-10-CM

## 2021-05-20 DIAGNOSIS — R11.0 NAUSEA: ICD-10-CM

## 2021-05-20 DIAGNOSIS — K92.2 LOWER GI BLEED: ICD-10-CM

## 2021-05-20 LAB
A/G RATIO: 1.5 (ref 1.1–2.2)
ALBUMIN SERPL-MCNC: 4.1 G/DL (ref 3.4–5)
ALP BLD-CCNC: 67 U/L (ref 40–129)
ALT SERPL-CCNC: 20 U/L (ref 10–40)
ANION GAP SERPL CALCULATED.3IONS-SCNC: 11 MMOL/L (ref 3–16)
AST SERPL-CCNC: 14 U/L (ref 15–37)
BASOPHILS ABSOLUTE: 0.1 K/UL (ref 0–0.2)
BASOPHILS RELATIVE PERCENT: 0.5 %
BILIRUB SERPL-MCNC: <0.2 MG/DL (ref 0–1)
BUN BLDV-MCNC: 18 MG/DL (ref 7–20)
CALCIUM SERPL-MCNC: 9.1 MG/DL (ref 8.3–10.6)
CHLORIDE BLD-SCNC: 106 MMOL/L (ref 99–110)
CO2: 21 MMOL/L (ref 21–32)
CREAT SERPL-MCNC: 1 MG/DL (ref 0.6–1.1)
EOSINOPHILS ABSOLUTE: 0.2 K/UL (ref 0–0.6)
EOSINOPHILS RELATIVE PERCENT: 2.4 %
GFR AFRICAN AMERICAN: >60
GFR NON-AFRICAN AMERICAN: >60
GLOBULIN: 2.7 G/DL
GLUCOSE BLD-MCNC: 107 MG/DL (ref 70–99)
HCG QUALITATIVE: NEGATIVE
HCT VFR BLD CALC: 40.1 % (ref 36–48)
HEMOGLOBIN: 13.8 G/DL (ref 12–16)
LIPASE: 22 U/L (ref 13–60)
LYMPHOCYTES ABSOLUTE: 3.4 K/UL (ref 1–5.1)
LYMPHOCYTES RELATIVE PERCENT: 32.8 %
MCH RBC QN AUTO: 32.1 PG (ref 26–34)
MCHC RBC AUTO-ENTMCNC: 34.5 G/DL (ref 31–36)
MCV RBC AUTO: 92.9 FL (ref 80–100)
MONOCYTES ABSOLUTE: 0.7 K/UL (ref 0–1.3)
MONOCYTES RELATIVE PERCENT: 6.6 %
NEUTROPHILS ABSOLUTE: 6 K/UL (ref 1.7–7.7)
NEUTROPHILS RELATIVE PERCENT: 57.7 %
PDW BLD-RTO: 12.8 % (ref 12.4–15.4)
PLATELET # BLD: 450 K/UL (ref 135–450)
PMV BLD AUTO: 6.4 FL (ref 5–10.5)
POTASSIUM REFLEX MAGNESIUM: 4 MMOL/L (ref 3.5–5.1)
RBC # BLD: 4.32 M/UL (ref 4–5.2)
SODIUM BLD-SCNC: 138 MMOL/L (ref 136–145)
TOTAL PROTEIN: 6.8 G/DL (ref 6.4–8.2)
WBC # BLD: 10.5 K/UL (ref 4–11)

## 2021-05-20 PROCEDURE — 80053 COMPREHEN METABOLIC PANEL: CPT

## 2021-05-20 PROCEDURE — 85025 COMPLETE CBC W/AUTO DIFF WBC: CPT

## 2021-05-20 PROCEDURE — 84703 CHORIONIC GONADOTROPIN ASSAY: CPT

## 2021-05-20 PROCEDURE — 83690 ASSAY OF LIPASE: CPT

## 2021-05-20 ASSESSMENT — PAIN SCALES - GENERAL: PAINLEVEL_OUTOF10: 3

## 2021-05-21 VITALS
HEART RATE: 72 BPM | HEIGHT: 62 IN | OXYGEN SATURATION: 97 % | SYSTOLIC BLOOD PRESSURE: 125 MMHG | DIASTOLIC BLOOD PRESSURE: 90 MMHG | TEMPERATURE: 98.1 F | WEIGHT: 174 LBS | BODY MASS INDEX: 32.02 KG/M2 | RESPIRATION RATE: 16 BRPM

## 2021-05-21 LAB
AMORPHOUS: ABNORMAL /HPF
BACTERIA: ABNORMAL /HPF
BILIRUBIN URINE: NEGATIVE
BLOOD, URINE: NEGATIVE
CLARITY: ABNORMAL
COLOR: YELLOW
EPITHELIAL CELLS, UA: ABNORMAL /HPF (ref 0–5)
GLUCOSE URINE: NEGATIVE MG/DL
KETONES, URINE: NEGATIVE MG/DL
LEUKOCYTE ESTERASE, URINE: NEGATIVE
MICROSCOPIC EXAMINATION: YES
MUCUS: ABNORMAL /LPF
NITRITE, URINE: NEGATIVE
PH UA: 7 (ref 5–8)
PROTEIN UA: NEGATIVE MG/DL
RBC UA: ABNORMAL /HPF (ref 0–4)
SPECIFIC GRAVITY UA: 1.02 (ref 1–1.03)
URINE TYPE: ABNORMAL
UROBILINOGEN, URINE: 0.2 E.U./DL
WBC UA: ABNORMAL /HPF (ref 0–5)

## 2021-05-21 PROCEDURE — 2580000003 HC RX 258: Performed by: PHYSICIAN ASSISTANT

## 2021-05-21 PROCEDURE — 99283 EMERGENCY DEPT VISIT LOW MDM: CPT

## 2021-05-21 PROCEDURE — 6370000000 HC RX 637 (ALT 250 FOR IP): Performed by: PHYSICIAN ASSISTANT

## 2021-05-21 PROCEDURE — 81001 URINALYSIS AUTO W/SCOPE: CPT

## 2021-05-21 RX ORDER — DICYCLOMINE HCL 20 MG
20 TABLET ORAL 4 TIMES DAILY PRN
Qty: 20 TABLET | Refills: 0 | Status: SHIPPED | OUTPATIENT
Start: 2021-05-21 | End: 2021-09-15

## 2021-05-21 RX ORDER — OMEPRAZOLE 20 MG/1
20 CAPSULE, DELAYED RELEASE ORAL
Qty: 14 CAPSULE | Refills: 0 | Status: SHIPPED | OUTPATIENT
Start: 2021-05-21 | End: 2021-10-13

## 2021-05-21 RX ORDER — DICYCLOMINE HCL 20 MG
20 TABLET ORAL ONCE
Status: COMPLETED | OUTPATIENT
Start: 2021-05-21 | End: 2021-05-21

## 2021-05-21 RX ORDER — 0.9 % SODIUM CHLORIDE 0.9 %
1000 INTRAVENOUS SOLUTION INTRAVENOUS ONCE
Status: COMPLETED | OUTPATIENT
Start: 2021-05-21 | End: 2021-05-21

## 2021-05-21 RX ADMIN — SODIUM CHLORIDE 1000 ML: 9 INJECTION, SOLUTION INTRAVENOUS at 01:16

## 2021-05-21 RX ADMIN — DICYCLOMINE HYDROCHLORIDE 20 MG: 20 TABLET ORAL at 01:16

## 2021-05-21 ASSESSMENT — ENCOUNTER SYMPTOMS
NAUSEA: 0
VOMITING: 0
COUGH: 0
COLOR CHANGE: 0
SHORTNESS OF BREATH: 0
ABDOMINAL PAIN: 1
BLOOD IN STOOL: 1
BACK PAIN: 0
EYES NEGATIVE: 1

## 2021-05-21 NOTE — ED NOTES
Discharge instructions reviewed with patient. Reviewed prescriptions with patient. No additional questions asked. Voiced understanding. Encouraged patient to follow up as discussed by the ED physician.      Sherrill Li RN  05/21/21 1175

## 2021-05-21 NOTE — ED PROVIDER NOTES
Neurological: Negative for dizziness and headaches. All other systems reviewed and are negative. Except as noted above in the ROS, all other systems were reviewed and negative.          PAST MEDICAL HISTORY:     Past Medical History:   Diagnosis Date    Anxiety     Asthma     Ectopic pregnancy     Enlarged pituitary gland ([address] 01/2019    Heart abnormalities     heart palpitations in early pregnancy         SURGICAL HISTORY:      Past Surgical History:   Procedure Laterality Date    CHOLECYSTECTOMY  02/16/2017    LAPAROSCOPIC CHOLECYSTECTOMY WITH CHOLANGIOGRAMS    COLONOSCOPY  5/19/14    normal    COLONOSCOPY N/A 8/27/2019    COLONOSCOPY WITH BIOPSY performed by Lucy Young DO at [facility] / BIOPSY SKIN LESION OF ARM Right 3/12/2019    EXCISION FOREARM LIPOMA TIMES TWO performed by Cody Setward MD at [place] [address] Right 03/12/2019    EXCISION FOREARM LIPOMA TIMES TWO (Right Arm Lower)    WISDOM TOOTH EXTRACTION      3 yrs ago         CURRENT MEDICATIONS:       Previous Medications    ALBUTEROL SULFATE HFA (PROVENTIL HFA) 108 (90 BASE) MCG/ACT INHALER    Inhale 2 puffs into the lungs every 4 hours as needed for Wheezing    IBUPROFEN (ADVIL;MOTRIN) 800 MG TABLET    Take 1 tablet by mouth every 8 hours as needed for Pain    ONDANSETRON (ZOFRAN ODT) 4 MG DISINTEGRATING TABLET    Take 1 tablet by mouth every 8 hours as needed for Nausea    PROBIOTIC PRODUCT (PRO-BIOTIC BLEND PO)    Take by mouth    TOPIRAMATE (TOPAMAX PO)    Take by mouth         ALLERGIES:    Pcn [penicillins]    FAMILY HISTORY:       Family History   Problem Relation Age of Onset    Diabetes Mother     High Cholesterol Mother     High Blood Pressure Mother     Heart Disease Mother     Cancer Maternal Aunt     Heart Disease Maternal Uncle     High Cholesterol Paternal Aunt     Heart Disease Paternal Aunt     High Cholesterol Paternal Uncle     Heart Disease Paternal Uncle     High Cholesterol Maternal Grandmother     Heart Disease Maternal Grandmother     High Cholesterol Maternal Grandfather     Heart Disease Maternal Grandfather           SOCIAL HISTORY:       Social History     Socioeconomic History    Marital status:      Spouse name: None    Number of children: None    Years of education: None    Highest education level: None   Occupational History    None   Tobacco Use    Smoking status: Current Every Day Smoker     Packs/day: 1.00     Years: 3.00     Pack years: 3.00     Types: Cigarettes    Smokeless tobacco: Never Used    Tobacco comment: Vapor    Vaping Use    Vaping Use: Never assessed   Substance and Sexual Activity    Alcohol use: No    Drug use: Yes     Types: Marijuana     Comment: Used today for the first time in a year    Sexual activity: Yes     Partners: Male   Other Topics Concern    None   Social History Narrative    None     Social Determinants of Health     Financial Resource Strain:     Difficulty of Paying Living Expenses:    Food Insecurity:     Worried About Running Out of Food in the Last Year:     Ran Out of Food in the Last Year:    Transportation Needs:     Lack of Transportation (Medical):      Lack of Transportation (Non-Medical):    Physical Activity:     Days of Exercise per Week:     Minutes of Exercise per Session:    Stress:     Feeling of Stress :    Social Connections:     Frequency of Communication with Friends and Family:     Frequency of Social Gatherings with Friends and Family:     Attends Nondenominational Services:     Active Member of Clubs or Organizations:     Attends Club or Organization Meetings:     Marital Status:    Intimate Partner Violence:     Fear of Current or Ex-Partner:     Emotionally Abused:     Physically Abused:     Sexually Abused:        SCREENINGS:             PHYSICAL EXAM:       ED Triage Vitals [05/20/21 2309]   BP Temp Temp Source Pulse Resp SpO2 Height Weight   (!) 128/91 98.8 °F (37.1 °C) Oral 86 18 99 % 5' 2\" (1.575 m) 174 lb (78.9 kg)       Physical Exam    CONSTITUTIONAL: Awake and alert. Cooperative. Well-developed. Well-nourished. Non-toxic. No acute distress. HENT: Normocephalic. Atraumatic. External ears normal, without discharge. No nasal discharge. Oropharynx clear. Mucous membranes moist.  EYES: Conjunctiva non-injected. No scleral icterus. PERRL. EOM's grossly intact. NECK: Supple. Normal ROM. CARDIOVASCULAR: RRR. No Murmer. Intact distal pulses. PULMONARY/CHEST WALL: Effort normal. No tachypnea. Lungs clear to ausculation. ABDOMEN: Normal BS. Soft. Nondistended. No tenderness to palpate. No guarding. /ANORECTAL: Soft, nonthrombosed external hemorrhoids. No blood to the perianal region. MUSKULOSKELETAL: Normal ROM. No acute deformities. No edema. No tenderness to palpate. SKIN: Warm and dry. No rash. NEUROLOGICAL: Alert and oriented x 3. GCS 15. CN II-XII grossly intact. Strength is 5/5 in all extremities and sensation is intact. Normal gait.    PSYCHIATRIC: Normal affect        DIAGNOSTICRESULTS:     LABS:    Results for orders placed or performed during the hospital encounter of 05/20/21   CBC auto differential   Result Value Ref Range    WBC 10.5 4.0 - 11.0 K/uL    RBC 4.32 4.00 - 5.20 M/uL    Hemoglobin 13.8 12.0 - 16.0 g/dL    Hematocrit 40.1 36.0 - 48.0 %    MCV 92.9 80.0 - 100.0 fL    MCH 32.1 26.0 - 34.0 pg    MCHC 34.5 31.0 - 36.0 g/dL    RDW 12.8 12.4 - 15.4 %    Platelets 142 514 - 333 K/uL    MPV 6.4 5.0 - 10.5 fL    Neutrophils % 57.7 %    Lymphocytes % 32.8 %    Monocytes % 6.6 %    Eosinophils % 2.4 %    Basophils % 0.5 %    Neutrophils Absolute 6.0 1.7 - 7.7 K/uL    Lymphocytes Absolute 3.4 1.0 - 5.1 K/uL    Monocytes Absolute 0.7 0.0 - 1.3 K/uL    Eosinophils Absolute 0.2 0.0 - 0.6 K/uL    Basophils Absolute 0.1 0.0 - 0.2 K/uL   Comprehensive Metabolic Panel w/ Reflex to MG   Result Value Ref Range    Sodium 138 136 - 145 mmol/L Potassium reflex Magnesium 4.0 3.5 - 5.1 mmol/L    Chloride 106 99 - 110 mmol/L    CO2 21 21 - 32 mmol/L    Anion Gap 11 3 - 16    Glucose 107 (H) 70 - 99 mg/dL    BUN 18 7 - 20 mg/dL    CREATININE 1.0 0.6 - 1.1 mg/dL    GFR Non-African American >60 >60    GFR African American >60 >60    Calcium 9.1 8.3 - 10.6 mg/dL    Total Protein 6.8 6.4 - 8.2 g/dL    Albumin 4.1 3.4 - 5.0 g/dL    Albumin/Globulin Ratio 1.5 1.1 - 2.2    Total Bilirubin <0.2 0.0 - 1.0 mg/dL    Alkaline Phosphatase 67 40 - 129 U/L    ALT 20 10 - 40 U/L    AST 14 (L) 15 - 37 U/L    Globulin 2.7 g/dL   Lipase   Result Value Ref Range    Lipase 22.0 13.0 - 60.0 U/L   HCG Qualitative, Serum   Result Value Ref Range    hCG Qual Negative Detects HCG level >10 MIU/mL   Urinalysis, reflex to microscopic   Result Value Ref Range    Color, UA Yellow Straw/Yellow    Clarity, UA CLOUDY (A) Clear    Glucose, Ur Negative Negative mg/dL    Bilirubin Urine Negative Negative    Ketones, Urine Negative Negative mg/dL    Specific Gravity, UA 1.020 1.005 - 1.030    Blood, Urine Negative Negative    pH, UA 7.0 5.0 - 8.0    Protein, UA Negative Negative mg/dL    Urobilinogen, Urine 0.2 <2.0 E.U./dL    Nitrite, Urine Negative Negative    Leukocyte Esterase, Urine Negative Negative    Microscopic Examination YES     Urine Type NotGiven    Microscopic Urinalysis   Result Value Ref Range    Mucus, UA 1+ (A) None Seen /LPF    WBC, UA 0-2 0 - 5 /HPF    RBC, UA None seen 0 - 4 /HPF    Epithelial Cells, UA 2-5 0 - 5 /HPF    Bacteria, UA Rare (A) None Seen /HPF    Amorphous, UA 4+ /HPF         PROCEDURES:   N/A    CRITICAL CARE TIME:       none    CONSULTS:  None      EMERGENCY DEPARTMENT COURSE and DIFFERENTIAL DIAGNOSIS/MDM:   Vitals:    Vitals:    05/20/21 2309 05/21/21 0114   BP: (!) 128/91 124/89   Pulse: 86 72   Resp: 18    Temp: 98.8 °F (37.1 °C)    TempSrc: Oral    SpO2: 99% 97%   Weight: 174 lb (78.9 kg)    Height: 5' 2\" (1.575 m)        Patient was given the following

## 2021-09-15 ENCOUNTER — HOSPITAL ENCOUNTER (EMERGENCY)
Age: 36
Discharge: HOME | End: 2021-09-15
Attending: EMERGENCY MEDICINE
Payer: COMMERCIAL

## 2021-09-15 ENCOUNTER — APPOINTMENT (OUTPATIENT)
Dept: GENERAL RADIOLOGY | Age: 36
End: 2021-09-15
Payer: COMMERCIAL

## 2021-09-15 VITALS
WEIGHT: 174 LBS | SYSTOLIC BLOOD PRESSURE: 124 MMHG | OXYGEN SATURATION: 99 % | RESPIRATION RATE: 14 BRPM | HEIGHT: 63 IN | DIASTOLIC BLOOD PRESSURE: 85 MMHG | TEMPERATURE: 98.2 F | HEART RATE: 72 BPM | BODY MASS INDEX: 30.83 KG/M2

## 2021-09-15 DIAGNOSIS — M54.2 NECK PAIN: ICD-10-CM

## 2021-09-15 DIAGNOSIS — R07.9 CHEST PAIN, UNSPECIFIED TYPE: Primary | ICD-10-CM

## 2021-09-15 LAB
ANION GAP SERPL CALCULATED.3IONS-SCNC: 11 MMOL/L (ref 3–16)
BASOPHILS ABSOLUTE: 0 K/UL (ref 0–0.2)
BASOPHILS RELATIVE PERCENT: 0.2 %
BUN BLDV-MCNC: 14 MG/DL (ref 7–20)
CALCIUM SERPL-MCNC: 8.9 MG/DL (ref 8.3–10.6)
CHLORIDE BLD-SCNC: 105 MMOL/L (ref 99–110)
CO2: 19 MMOL/L (ref 21–32)
CREAT SERPL-MCNC: 0.9 MG/DL (ref 0.6–1.1)
EKG ATRIAL RATE: 77 BPM
EKG DIAGNOSIS: NORMAL
EKG P AXIS: 33 DEGREES
EKG P-R INTERVAL: 134 MS
EKG Q-T INTERVAL: 388 MS
EKG QRS DURATION: 88 MS
EKG QTC CALCULATION (BAZETT): 439 MS
EKG R AXIS: 75 DEGREES
EKG T AXIS: 52 DEGREES
EKG VENTRICULAR RATE: 77 BPM
EOSINOPHILS ABSOLUTE: 0.2 K/UL (ref 0–0.6)
EOSINOPHILS RELATIVE PERCENT: 2.1 %
GFR AFRICAN AMERICAN: >60
GFR NON-AFRICAN AMERICAN: >60
GLUCOSE BLD-MCNC: 105 MG/DL (ref 70–99)
HCG QUALITATIVE: NEGATIVE
HCT VFR BLD CALC: 40.9 % (ref 36–48)
HEMOGLOBIN: 13.8 G/DL (ref 12–16)
LYMPHOCYTES ABSOLUTE: 3.4 K/UL (ref 1–5.1)
LYMPHOCYTES RELATIVE PERCENT: 29.4 %
MCH RBC QN AUTO: 31.8 PG (ref 26–34)
MCHC RBC AUTO-ENTMCNC: 33.9 G/DL (ref 31–36)
MCV RBC AUTO: 93.8 FL (ref 80–100)
MONOCYTES ABSOLUTE: 0.7 K/UL (ref 0–1.3)
MONOCYTES RELATIVE PERCENT: 5.9 %
NEUTROPHILS ABSOLUTE: 7.2 K/UL (ref 1.7–7.7)
NEUTROPHILS RELATIVE PERCENT: 62.4 %
PDW BLD-RTO: 12.8 % (ref 12.4–15.4)
PLATELET # BLD: 427 K/UL (ref 135–450)
PMV BLD AUTO: 6.2 FL (ref 5–10.5)
POTASSIUM SERPL-SCNC: 3.9 MMOL/L (ref 3.5–5.1)
RBC # BLD: 4.36 M/UL (ref 4–5.2)
SODIUM BLD-SCNC: 135 MMOL/L (ref 136–145)
TROPONIN: <0.01 NG/ML
WBC # BLD: 11.6 K/UL (ref 4–11)

## 2021-09-15 PROCEDURE — 93005 ELECTROCARDIOGRAM TRACING: CPT | Performed by: EMERGENCY MEDICINE

## 2021-09-15 PROCEDURE — 99283 EMERGENCY DEPT VISIT LOW MDM: CPT

## 2021-09-15 PROCEDURE — 96374 THER/PROPH/DIAG INJ IV PUSH: CPT

## 2021-09-15 PROCEDURE — 6360000002 HC RX W HCPCS: Performed by: EMERGENCY MEDICINE

## 2021-09-15 PROCEDURE — 80048 BASIC METABOLIC PNL TOTAL CA: CPT

## 2021-09-15 PROCEDURE — 85025 COMPLETE CBC W/AUTO DIFF WBC: CPT

## 2021-09-15 PROCEDURE — 84703 CHORIONIC GONADOTROPIN ASSAY: CPT

## 2021-09-15 PROCEDURE — 84484 ASSAY OF TROPONIN QUANT: CPT

## 2021-09-15 PROCEDURE — 93010 ELECTROCARDIOGRAM REPORT: CPT | Performed by: INTERNAL MEDICINE

## 2021-09-15 PROCEDURE — 71046 X-RAY EXAM CHEST 2 VIEWS: CPT

## 2021-09-15 RX ORDER — KETOROLAC TROMETHAMINE 30 MG/ML
30 INJECTION, SOLUTION INTRAMUSCULAR; INTRAVENOUS ONCE
Status: COMPLETED | OUTPATIENT
Start: 2021-09-15 | End: 2021-09-15

## 2021-09-15 RX ADMIN — KETOROLAC TROMETHAMINE 30 MG: 30 INJECTION, SOLUTION INTRAMUSCULAR at 02:58

## 2021-09-15 ASSESSMENT — PAIN DESCRIPTION - PAIN TYPE: TYPE: ACUTE PAIN

## 2021-09-15 ASSESSMENT — ENCOUNTER SYMPTOMS
RHINORRHEA: 0
SHORTNESS OF BREATH: 1
DIARRHEA: 1
BACK PAIN: 1
EYE ITCHING: 0
VOMITING: 0
ABDOMINAL PAIN: 0

## 2021-09-15 ASSESSMENT — PAIN SCALES - GENERAL
PAINLEVEL_OUTOF10: 3
PAINLEVEL_OUTOF10: 3

## 2021-09-15 ASSESSMENT — PAIN DESCRIPTION - DESCRIPTORS: DESCRIPTORS: SHARP;DULL

## 2021-09-15 ASSESSMENT — PAIN DESCRIPTION - LOCATION: LOCATION: CHEST

## 2021-09-15 ASSESSMENT — HEART SCORE: ECG: 0

## 2021-09-15 ASSESSMENT — PAIN DESCRIPTION - ORIENTATION: ORIENTATION: LEFT

## 2021-09-15 NOTE — ED PROVIDER NOTES
[facility]  ED  EMERGENCY DEPARTMENT ENCOUNTER      Pt Name: Essence Lema  MRN: [id number]  [place] 1985  Date of evaluation: 9/15/2021  Provider: Matteo Morales MD    CHIEF COMPLAINT       Chief Complaint   Patient presents with    Chest Pain     CP x 3-4 days. Pt states pain now radiating to the left side of her neck, shoulder and jaw. HISTORY OF PRESENT ILLNESS   (Location/Symptom, Timing/Onset,Context/Setting, Quality, Duration, Modifying Factors, Severity)  Note limiting factors. Essence Hargrove is a 38 y.o. female who presents to the emergency department for chest pain for 4 days, intermittently. Seen PCP on Monday and placed on Mag and metoprolol because of her history of palpitations with previous improvement with metoprolol. Worsening chest pain tonight, radiating to neck, head and shoulder about 7pm and still not improving even after taking metoprolol. She feels like her neck is thin she has discomfort in her left arm which she is unable to pinpoint. She keeps moving her left arm around. 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 She has tried chiropractor in the past and has gotten some relief. Nursing notes were reviewed. REVIEW OF SYSTEMS    (2-9 systems for level 4, 10 or more for level 5)     Review of Systems   Constitutional: Negative for fever. HENT: Negative for rhinorrhea. Eyes: Negative for itching. Respiratory: Positive for shortness of breath. Occasionally but not tonight   Cardiovascular: Positive for chest pain and palpitations. Gastrointestinal: Positive for diarrhea. Negative for abdominal pain and vomiting. Genitourinary: Negative for dysuria. Musculoskeletal: Positive for back pain, neck pain and neck stiffness. Skin: Negative for rash. Allergic/Immunologic: Negative for environmental allergies. Neurological: Negative for light-headedness. Hematological: Does not bruise/bleed easily. SOCIAL HISTORY       Social History     Socioeconomic History    Marital status:      Spouse name: None    Number of children: None    Years of education: None    Highest education level: None   Occupational History    None   Tobacco Use    Smoking status: Current Every Day Smoker     Packs/day: 1.00     Years: 3.00     Pack years: 3.00     Types: Cigarettes    Smokeless tobacco: Never Used    Tobacco comment: Vapor    Vaping Use    Vaping Use: Never used   Substance and Sexual Activity    Alcohol use: No    Drug use: Not Currently     Types: Marijuana    Sexual activity: Yes     Partners: Male   Other Topics Concern    None   Social History Narrative    None     Social Determinants of Health     Financial Resource Strain:     Difficulty of Paying Living Expenses:    Food Insecurity:     Worried About Running Out of Food in the Last Year:     Ran Out of Food in the Last Year:    Transportation Needs:     Lack of Transportation (Medical):      Lack of Transportation (Non-Medical):    Physical Activity:     Days of Exercise per Week:     Minutes of Exercise per Session:    Stress:     Feeling of Stress :    Social Connections:     Frequency of Communication with Friends and Family:     Frequency of Social Gatherings with Friends and Family:     Attends Hoahaoism Services:     Active Member of Clubs or Organizations:     Attends Club or Organization Meetings:     Marital Status:    Intimate Partner Violence:     Fear of Current or Ex-Partner:     Emotionally Abused:     Physically Abused:     Sexually Abused:        SCREENINGS      Heart Score for chest pain patients  History: Slightly Suspicious  ECG: Normal  Patient Age: < 45 years  *Risk factors for Atherosclerotic disease: Cigarette smoking  Risk Factors: 1 or 2 risk factors  Troponin: < 1X normal limit  Heart Score Total: 1      PHYSICAL EXAM    (up to 7 for level 4, 8 or more for level 5)     ED Triage Vitals [09/15/21 0101]   BP Temp Temp Source Pulse Resp SpO2 Height Weight   126/86 98.2 °F (36.8 °C) Oral 75 16 98 % 5' 3\" (1.6 m) 174 lb (78.9 kg)       Physical Exam  Vitals and nursing note reviewed. Constitutional:       Appearance: Normal appearance. She is well-developed. She is not ill-appearing. HENT:      Head: Normocephalic and atraumatic. Right Ear: External ear normal.      Left Ear: External ear normal.      Nose: Nose normal.   Eyes:      General: No scleral icterus. Right eye: No discharge. Left eye: No discharge. Conjunctiva/sclera: Conjunctivae normal.   Cardiovascular:      Rate and Rhythm: Normal rate and regular rhythm. Heart sounds: Normal heart sounds. Pulmonary:      Effort: Pulmonary effort is normal. No respiratory distress. Breath sounds: Normal breath sounds. No wheezing or rales. Abdominal:      General: Bowel sounds are normal. There is no distension. Palpations: Abdomen is soft. Tenderness: There is no abdominal tenderness. There is no guarding or rebound. Musculoskeletal:         General: No swelling, tenderness, deformity or signs of injury. Cervical back: Neck supple. Skin:     Coloration: Skin is not pale. Neurological:      Mental Status: She is alert. Psychiatric:         Mood and Affect: Mood normal.         Behavior: Behavior normal.             DIAGNOSTIC RESULTS     EKG: All EKG's are interpreted by the Emergency Department Physician who either signs or Co-signs this chart in the absence of a cardiologist.    12 lead EKG shows Normal sinus rhythm, VT interval QRS QTC normal.  Normal axis. No acute ischemic changes. No significant changes when compared to previous EKG January 2021.     RADIOLOGY:   Non-plain film images such as CT, Ultrasound and MRI are read by the radiologist. Plain radiographic images are visualized and preliminarily interpreted by the emergency physician with the below findings:      Interpretation per the Radiologist below, if available at the time of this note:    XR CHEST (2 VW)   Final Result   No acute findings in the chest.               ED BEDSIDE ULTRASOUND:   Performed by ED Physician - none    LABS:  Labs Reviewed   BASIC METABOLIC PANEL - Abnormal; Notable for the following components:       Result Value    Sodium 135 (*)     CO2 19 (*)     Glucose 105 (*)     All other components within normal limits    Narrative:     Performed at:  [address], [facility]   Phone [phone]   CBC WITH AUTO DIFFERENTIAL - Abnormal; Notable for the following components:    WBC 11.6 (*)     All other components within normal limits    Narrative:     Performed at:  [address], [facility]   Phone [phone]   TROPONIN    Narrative:     Performed at:  [address], [facility]   Phone [phone]   HCG, SERUM, QUALITATIVE    Narrative:     Performed at:  [address], [facility]   Phone [phone]       All other labs were within normal range or not returned as of this dictation. EMERGENCY DEPARTMENT COURSE and DIFFERENTIAL DIAGNOSIS/MDM:   Vitals:    Vitals:    09/15/21 0101 09/15/21 0242   BP: 126/86 124/85   Pulse: 75 72   Resp: 16 14   Temp: 98.2 °F (36.8 °C)    TempSrc: Oral    SpO2: 98% 99%   Weight: 174 lb (78.9 kg)    Height: 5' 3\" (1.6 m)        Adult female who comes in for chest pain intermittently for past 4 days. Laboratory studies EKG and chest x-ray ordered. She is placed on cardiac blood pressure pulse ox reading monitoring. She also feels like she is having stiffness of the neck. She is given Toradol for her pain. Cardiac monitor as interpreted by myself shows normal sinus rhythm. EKG shows no acute process.     Chest x-ray and laboratory studies also showed no acute process that would explain the patient's symptoms. The patient is reassessed she does have some mild left neck and upper shoulder tenderness. Patient is low risk for serious or life-threatening condition at this time. She has had cardiac evaluation including a negative stress test in February and a normal echo. I do not believe that any further work-up is required in the emergency room. I recommend follow-up with her primary care provider and her cardiologist I do however believe that she would benefit from physical therapy as there seems to be a focus of pain in the neck. CRITICAL CARE TIME   None       CONSULTS:  None    PROCEDURES:       Procedures    FINAL IMPRESSION      1. Chest pain, unspecified type    2.  Neck pain          DISPOSITION/PLAN   DISPOSITION Decision To Discharge 09/15/2021 02:48:40 AM      PATIENT REFERREDTO:  [address] [place] [address]  Schedule an appointment as soon as possible for a visit today      CEDRICK Palomares CNP  [address]  [address]  [facility]  [phone]    Schedule an appointment as soon as possible for a visit         Contact your cardiologist in the morning to schedule a close follow up within the next 72 hours          DISCHARGEMEDICATIONS:  New Prescriptions    No medications on file          (Please note that portions of this note were completed with a voice recognition program.  Efforts were made to edit the dictations but occasionally words are mis-transcribed.)    Adeline Dangelo MD (electronically signed)  Attending Emergency Physician        Adeline Dangelo MD  09/15/21 0303

## 2021-10-05 ENCOUNTER — HOSPITAL ENCOUNTER (OUTPATIENT)
Age: 36
Discharge: HOME | End: 2021-10-05
Payer: COMMERCIAL

## 2021-10-05 ENCOUNTER — HOSPITAL ENCOUNTER (OUTPATIENT)
Dept: GENERAL RADIOLOGY | Age: 36
Discharge: HOME | End: 2021-10-05
Payer: COMMERCIAL

## 2021-10-05 DIAGNOSIS — S39.92XD INJURY OF COCCYX, SUBSEQUENT ENCOUNTER: ICD-10-CM

## 2021-10-05 PROCEDURE — 72220 X-RAY EXAM SACRUM TAILBONE: CPT

## 2021-10-09 NOTE — PROGRESS NOTES
[facility] [address]   CARDIAC EVALUATION NOTE  [phone]      PCP:  CEDRICK Gonzales CNP    Reason for Consultation/Chief Complaint: follow up for CP      Subjective   History of Present Illness:  Essence Mccray is a 38 y.o. patient with a history of palpitations who presents for follow up. She is a previous pt of Dr Arabella Alejandro. On 10/26/17, she presented with palpitations, chest pain and shortness of breath. She reports her chest pain is in the center with radiation across her chest. She has radiation into her left shoulder and axillary. She reports \"her heart fells like its collapsing on her. \" She reports the episodes are with and without activity. She started on antianxiety meds that have not really helped. She reports the pain lasts up to 2 hours. If the pain is more than 2 hours, she goes to the ER. She reports she has episodes of shortness of breath with the chest pain. She reports she has \"racing heart beat\" at onset of chest pain. She reports her Fit Bit showed her heart rate in the 130s. She reports the palpitations are increasing in frequency. Occasionally she has the palpitations unrelated to chest pain. She has increased stress at her job. Her EKG from 10/10/18 showed SR with early repolarization HR 85. She wore a cardiac event monitor 10/26-11/24/17 which showed PVC's, PAC and ST. She underwent stress echo on 11/8/17 which was normal with an EF of 55%. She was started on Lopressor. This has helped with her palpitations. Her echo from 02/16/21 showed her EF was 55%. Her stress test from 02/16/21 was non diagnostic. Recommended stress echo but this was not completed. Her cardiac monitor from 03/2021 showed SR, PACs and AIVR. She presented to the ER on 09/15/21 with CP. Her troponin was negative. Today she reports CP she describes as pressure which radiates into her neck and shoulder. The CP returned after stopping metoprolol. Her PCP recommended restarting metoprolol and magnesium.  She restarted the medications about 2.5-3 weeks ago. She reports her symptoms are better with the medications but still present. Her symptoms may be anxiety related. She denies palpitations, dizziness, BLE edema or syncope. Past Medical History:   has a past medical history of Anxiety, Asthma, Ectopic pregnancy, Enlarged pituitary gland ([address] and Heart abnormalities. Surgical History:   has a past surgical history that includes [place] tooth extraction; Colonoscopy (5/19/14); Cholecystectomy (02/16/2017); other surgical history (Right, 03/12/2019); EXCISION / BIOPSY SKIN LESION OF ARM (Right, 3/12/2019); and Colonoscopy (N/A, 8/27/2019). Social History:   reports that she has been smoking cigarettes. She has a 3.00 pack-year smoking history. She has never used smokeless tobacco. She reports previous drug use. Drug: Marijuana. She reports that she does not drink alcohol. Family History:  family history includes Cancer in her maternal aunt; Diabetes in her mother; Heart Disease in her maternal grandfather, maternal grandmother, maternal uncle, mother, paternal aunt, and paternal uncle; High Blood Pressure in her mother; High Cholesterol in her maternal grandfather, maternal grandmother, mother, paternal aunt, and paternal uncle. Home Medications:  Were reviewed and are listed in nursing record and/or below  Prior to Admission medications    Medication Sig Start Date End Date Taking?  Authorizing Provider   metoprolol tartrate (LOPRESSOR) 25 MG tablet Take 12.5 mg by mouth 2 times daily  9/13/21  Yes Historical Provider, MD   MAGNESIUM-OXIDE 400 (241.3 Mg) MG TABS tablet 400 mg daily  9/13/21  Yes Historical Provider, MD   omeprazole (PRILOSEC) 20 MG delayed release capsule Take 1 capsule by mouth every morning (before breakfast) for 14 days 5/21/21 10/13/21 Yes KHLOE Barney   Probiotic Product (PRO-BIOTIC BLEND PO) Take by mouth   Yes Historical Provider, MD   Topiramate (TOPAMAX PO) Take by mouth   Yes Historical Provider, MD   albuterol sulfate HFA (PROVENTIL HFA) 108 (90 Base) MCG/ACT inhaler Inhale 2 puffs into the lungs every 4 hours as needed for Wheezing  Patient not taking: Reported on 10/13/2021 1/23/21 1/23/22  Sherrill Huynh DO          Allergies:  Pcn [penicillins]     Review of Systems:   A 14 point review of symptoms completed. Pertinent positives identified in the HPI, all other review of symptoms negative as below. Objective   PHYSICAL EXAM:    Vitals:    10/13/21 0833   BP: 108/80   Pulse: 85   SpO2: 96%    Weight: 181 lb (82.1 kg)       Gen: Patient in NAD, resting comfortably  Neck: No JVD or bruits  Respiratory: CTAB no WRR  Chest: normal without deformity  Cardiovascular:RRR, S1S2, no mrg, normal PMI  Abdomen: Soft, NTND, Normal BS  Extremities: No clubbing, cyanosis, or edema  Neurological/Psychiatric:  AxO x4, No gross motors/sensory deficits  Skin:  Warm and dry      Labs   CBC:   Lab Results   Component Value Date    WBC 11.6 09/15/2021    RBC 4.36 09/15/2021    HGB 13.8 09/15/2021    HCT 40.9 09/15/2021    MCV 93.8 09/15/2021    RDW 12.8 09/15/2021     09/15/2021     CMP:  Lab Results   Component Value Date     09/15/2021    K 3.9 09/15/2021    K 4.0 2021     09/15/2021    CO2 19 09/15/2021    BUN 14 09/15/2021    CREATININE 0.9 09/15/2021    GFRAA >60 09/15/2021    GFRAA >60 2012    AGRATIO 1.5 2021    LABGLOM >60 09/15/2021    GLUCOSE 105 09/15/2021    PROT 6.8 2021    PROT 6.7 2012    CALCIUM 8.9 09/15/2021    BILITOT <0.2 2021    ALKPHOS 67 2021    AST 14 2021    ALT 20 2021     PT/INR:  No results found for: PTINR  HgBA1c:No results found for: LABA1C  Lab Results   Component Value Date    TROPONINI <0.01 09/15/2021         Cardiac Data     Last EK/15/21  SR HR 77     Echo:    Stress/Echo 17  Echo   Baseline resting echocardiogram shows normal global LV systolic function   with an ejection fraction of 55% and uniform myocardial segmental wall   motion. Following stress there was uniform augmentation of all myocardial   segments with appropriate hyperdynamic LV systolic response to stress. ECG   1 mm ST horizontal depression with stress consistent with ischemia. Arrhythmias   No significant arrhythmia was observed. Symptoms   No symptoms with exercise. Normal echocardiographic response to stress, but abnormal EKG response. Echo: 02/16/21   Summary   Normal left ventricle systolic function with an estimated ejection fraction   of 55%. No regional wall motion abnormalities are seen. Normal left ventricular diastolic filling pressure. Trace mitral and tricuspid regurgitation. Inadequate tricuspid regurgitation to estimate systolic pulmonary artery   pressure. Stress Test: 02/16/21  Summary  Normal LVEF >60%  Normal wall motion  Breast attenuation artifact  Technically difficult study, heterogeneous tracer uptake  Inferior/anterior defects may be related to technical difficulties and less  likely true defects       Cath:    Studies:  Cardiac monitor: 03/2021       Assessment      1. Palpitation    2. Precordial pain    3. SOB (shortness of breath)              Plan   1. Continue current medications  2. No further cardiac testing at this time, ok to defer f/u testing as pt's cv status appears to be stable, reconsider testing if clinical status changes, d/w pt she understands/agrees to this plan   3. Follow up in 1 year        Scribe's attestation: This note was scribed in the presence of Dr. Harpreet Hare by Wilmer Serra RN      Thank you for allowing us to participate in the care of Essence Roca. Please call me with any questions [phone].     Harpreet Hare MD, [address] [facility]   Interventional Cardiologist  [facility]   [phone] [facility]  [phone] [address] [place] [address]  10/13/2021 9:06 AM    I will address the patient's cardiac risk factors and adjusted pharmacologic treatment as needed. In addition, I have reinforced the need for patient directed risk factor modification. Tobacco use was discussed with the patient and educated on the negative effects and was asked not to use. All questions and concerns were addressed to the patient/family. Alternatives to my treatment were discussed. I, Dr Afua Mcneil, personally performed the services described in this documentation, as scribed by the above signed scribe in my presence. It is both accurate and complete to my knowledge. I agree with the details independently gathered by the clinical support staff and the scribed note accurately describes my personal service to the patient.

## 2021-10-13 ENCOUNTER — OFFICE VISIT (OUTPATIENT)
Dept: CARDIOLOGY CLINIC | Age: 36
End: 2021-10-13
Payer: COMMERCIAL

## 2021-10-13 VITALS
OXYGEN SATURATION: 96 % | DIASTOLIC BLOOD PRESSURE: 80 MMHG | BODY MASS INDEX: 32.07 KG/M2 | SYSTOLIC BLOOD PRESSURE: 108 MMHG | HEART RATE: 85 BPM | HEIGHT: 63 IN | WEIGHT: 181 LBS

## 2021-10-13 DIAGNOSIS — R07.2 PRECORDIAL PAIN: ICD-10-CM

## 2021-10-13 DIAGNOSIS — R00.2 PALPITATION: Primary | ICD-10-CM

## 2021-10-13 DIAGNOSIS — R06.02 SOB (SHORTNESS OF BREATH): ICD-10-CM

## 2021-10-13 PROCEDURE — 99214 OFFICE O/P EST MOD 30 MIN: CPT | Performed by: INTERNAL MEDICINE

## 2021-10-13 NOTE — LETTER
She restarted the medications about 2.5-3 weeks ago. She reports her symptoms are better with the medications but still present. Her symptoms may be anxiety related. She denies palpitations, dizziness, BLE edema or syncope. Past Medical History:   has a past medical history of Anxiety, Asthma, Ectopic pregnancy, Enlarged pituitary gland ([address] and Heart abnormalities. Surgical History:   has a past surgical history that includes [place] tooth extraction; Colonoscopy (5/19/14); Cholecystectomy (02/16/2017); other surgical history (Right, 03/12/2019); EXCISION / BIOPSY SKIN LESION OF ARM (Right, 3/12/2019); and Colonoscopy (N/A, 8/27/2019). Social History:   reports that she has been smoking cigarettes. She has a 3.00 pack-year smoking history. She has never used smokeless tobacco. She reports previous drug use. Drug: Marijuana. She reports that she does not drink alcohol. Family History:  family history includes Cancer in her maternal aunt; Diabetes in her mother; Heart Disease in her maternal grandfather, maternal grandmother, maternal uncle, mother, paternal aunt, and paternal uncle; High Blood Pressure in her mother; High Cholesterol in her maternal grandfather, maternal grandmother, mother, paternal aunt, and paternal uncle. Home Medications:  Were reviewed and are listed in nursing record and/or below  Prior to Admission medications    Medication Sig Start Date End Date Taking?  Authorizing Provider   metoprolol tartrate (LOPRESSOR) 25 MG tablet Take 12.5 mg by mouth 2 times daily  9/13/21  Yes Historical Provider, MD   MAGNESIUM-OXIDE 400 (241.3 Mg) MG TABS tablet 400 mg daily  9/13/21  Yes Historical Provider, MD   omeprazole (PRILOSEC) 20 MG delayed release capsule Take 1 capsule by mouth every morning (before breakfast) for 14 days 5/21/21 10/13/21 Yes KHLOE Tubbs   Probiotic Product (PRO-BIOTIC BLEND PO) Take by mouth   Yes Historical Provider, MD   Topiramate (TOPAMAX PO) Take by mouth   Yes Historical Provider, MD   albuterol sulfate HFA (PROVENTIL HFA) 108 (90 Base) MCG/ACT inhaler Inhale 2 puffs into the lungs every 4 hours as needed for Wheezing  Patient not taking: Reported on 10/13/2021 1/23/21 1/23/22  June Cam,           Allergies:  Pcn [penicillins]     Review of Systems:   A 14 point review of symptoms completed. Pertinent positives identified in the HPI, all other review of symptoms negative as below. Objective   PHYSICAL EXAM:    Vitals:    10/13/21 0833   BP: 108/80   Pulse: 85   SpO2: 96%    Weight: 181 lb (82.1 kg)       Gen: Patient in NAD, resting comfortably  Neck: No JVD or bruits  Respiratory: CTAB no WRR  Chest: normal without deformity  Cardiovascular:RRR, S1S2, no mrg, normal PMI  Abdomen: Soft, NTND, Normal BS  Extremities: No clubbing, cyanosis, or edema  Neurological/Psychiatric:  AxO x4, No gross motors/sensory deficits  Skin:  Warm and dry      Labs   CBC:   Lab Results   Component Value Date    WBC 11.6 09/15/2021    RBC 4.36 09/15/2021    HGB 13.8 09/15/2021    HCT 40.9 09/15/2021    MCV 93.8 09/15/2021    RDW 12.8 09/15/2021     09/15/2021     CMP:  Lab Results   Component Value Date     09/15/2021    K 3.9 09/15/2021    K 4.0 2021     09/15/2021    CO2 19 09/15/2021    BUN 14 09/15/2021    CREATININE 0.9 09/15/2021    GFRAA >60 09/15/2021    GFRAA >60 2012    AGRATIO 1.5 2021    LABGLOM >60 09/15/2021    GLUCOSE 105 09/15/2021    PROT 6.8 2021    PROT 6.7 2012    CALCIUM 8.9 09/15/2021    BILITOT <0.2 2021    ALKPHOS 67 2021    AST 14 2021    ALT 20 2021     PT/INR:  No results found for: PTINR  HgBA1c:No results found for: LABA1C  Lab Results   Component Value Date    TROPONINI <0.01 09/15/2021         Cardiac Data     Last EK/15/21  SR HR 77     Echo:    Stress/Echo 17  Echo   Baseline resting echocardiogram shows normal global LV systolic function   with an ejection fraction of 55% and uniform myocardial segmental wall   motion. Following stress there was uniform augmentation of all myocardial   segments with appropriate hyperdynamic LV systolic response to stress. ECG   1 mm ST horizontal depression with stress consistent with ischemia. Arrhythmias   No significant arrhythmia was observed. Symptoms   No symptoms with exercise. Normal echocardiographic response to stress, but abnormal EKG response. Echo: 02/16/21   Summary   Normal left ventricle systolic function with an estimated ejection fraction   of 55%. No regional wall motion abnormalities are seen. Normal left ventricular diastolic filling pressure. Trace mitral and tricuspid regurgitation. Inadequate tricuspid regurgitation to estimate systolic pulmonary artery   pressure. Stress Test: 02/16/21  Summary  Normal LVEF >60%  Normal wall motion  Breast attenuation artifact  Technically difficult study, heterogeneous tracer uptake  Inferior/anterior defects may be related to technical difficulties and less  likely true defects       Cath:    Studies:  Cardiac monitor: 03/2021       Assessment      1. Palpitation    2. Precordial pain    3. SOB (shortness of breath)              Plan   1. Continue current medications  2. No further cardiac testing at this time, ok to defer f/u testing as pt's cv status appears to be stable, reconsider testing if clinical status changes, d/w pt she understands/agrees to this plan   3. Follow up in 1 year        Scribe's attestation: This note was scribed in the presence of Dr. Gerhard Powell by Prabhu Jones RN      Thank you for allowing us to participate in the care of Essence Roac. Please call me with any questions [phone].     Gerhard Powell MD, [facility]   Interventional Cardiologist  [facility]  [phone] [facility]  [phone] [address] [place] [address]  10/13/2021 9:06 AM    I will address the patient's cardiac risk factors and adjusted pharmacologic treatment as needed. In addition, I have reinforced the need for patient directed risk factor modification. Tobacco use was discussed with the patient and educated on the negative effects and was asked not to use. All questions and concerns were addressed to the patient/family. Alternatives to my treatment were discussed. I, Dr Denise Handley, personally performed the services described in this documentation, as scribed by the above signed scribe in my presence. It is both accurate and complete to my knowledge. I agree with the details independently gathered by the clinical support staff and the scribed note accurately describes my personal service to the patient.

## 2021-10-13 NOTE — PATIENT INSTRUCTIONS
1. Continue current medications  2. No further cardiac testing at this time  3.  Follow up in 1 year

## 2021-10-14 ENCOUNTER — HOSPITAL ENCOUNTER (OUTPATIENT)
Dept: PHYSICAL THERAPY | Age: 36
Setting detail: THERAPIES SERIES
Discharge: HOME | End: 2021-10-14
Payer: COMMERCIAL

## 2021-10-14 NOTE — PROGRESS NOTES
Physical Therapy    Pt called to cancel her physical therapy initial evaluation this date on the same day as her visit. This is her first same-day cancel.      Deejay Yousif, PT, DPT

## 2021-10-21 ENCOUNTER — HOSPITAL ENCOUNTER (OUTPATIENT)
Dept: PHYSICAL THERAPY | Age: 36
Setting detail: THERAPIES SERIES
Discharge: HOME | End: 2021-10-21
Payer: COMMERCIAL

## 2021-10-21 PROCEDURE — 97140 MANUAL THERAPY 1/> REGIONS: CPT

## 2021-10-21 PROCEDURE — 97161 PT EVAL LOW COMPLEX 20 MIN: CPT

## 2021-10-21 NOTE — PROGRESS NOTES
Physical Therapy  Initial Assessment  Date: 10/21/2021  Patient Name: Essence Sawant  MRN: [id number]  : 1985           Subjective   General  Chart Reviewed: Yes  Patient assessed for rehabilitation services?: Yes  Additional Pertinent Hx: asthma, anxiety, gall bladder removal, falls, tobacco use, pituitary tumor, 1-year Hx of dizziness/ vertigo (relief with meds)  Family / Caregiver Present: No  Referring Practitioner: Joe Gaines MD  Referral Date : 09/15/21  Diagnosis: M54.2 neck pain  General Comment  Comments: PLOF:  no cervical or UE limitations  PT Visit Information  Onset Date: 21  PT Insurance Information: [employer]  Subjective  Subjective: Pt c/o consistent pain in neck and intermittent N&T in BUE (L more frequently than R), as well as pressure in her chest, insiduous onset. Went to ED and was referred here after cardiac tests were (-). LUE pain increases as her neck tightness increases. 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 Rates pain as 3/10. Gets relief with some stretching. Has seen a chiropractor which only provided short-term relief. C/o intermittent HA which begin at her subocciput and spread to her ears and then the rest of her head, which she attributes to her pituitary tumor but states they've increasd in frequency since the onset of her neck pain.         C-SSRS Triggered by Intake questionnaire (Past 2 wk assessment):   [x] No, Questionnaire did not trigger screening.   [] Yes, Patient intake triggered further evaluation      [] C-SSRS Screening completed  [] PCP notified via Plan of Care   [] Emergency services notified    Objective     Observation/Palpation  Palpation: TTP to L first rib, CTJ, SPs C2-T6, R TPs C2-T2, L UT, subocciput    Spine  Cervical: flex 25, ext 35, SB R 33 with L UT pain, L 40: rotation R 45 L 39 (L UT pain with B rotation)  Special Tests: (-) for Spurling's, Sharp-Otto, vertebral artery test, ULTTs (Pt did note some ant wrist pain with these). Cervical distraction provided relief in LUE sxs. Joint Mobility  Spine: gross hypomobility C2-T6    Strength RUE  Comment: RUE myotomes 5/5 grossly  Strength LUE  Comment: LUE myotomes 5/5 grossly     Additional Measures  Other: C5,6,7 reflexes 2+ B                                           Assessment   Conditions Requiring Skilled Therapeutic Intervention  Body structures, Functions, Activity limitations: Decreased functional mobility ; Decreased sensation; Increased pain;Decreased posture;Decreased ROM  Assessment: Pt presents with gross cervical and upper thoracic jt mobility, notably at CTJ and her L first rib as well. Manual cervical distraction reduced LUE sxs.   Presents with opening restriction on L side of cervical spine  Prognosis: Good  Decision Making: Low Complexity  REQUIRES PT FOLLOW UP: Yes  Activity Tolerance  Activity Tolerance: Patient Tolerated treatment well         Plan   Plan  Times per week: 2x/wk x4 wks  Current Treatment Recommendations: Strengthening, Neuromuscular Re-education, Home Exercise Program, ROM, Manual Therapy - Soft Tissue Mobilization, Manual Therapy - Joint Manipulation, Modalities    G-Code   NDI:  24% disability           Goals  Short term goals  Time Frame for Short term goals: 2 wks  Short term goal 1: Pt will be ind and compliant with HEP  Short term goal 2: Pt will decrease pain by 50% in frequency or intensity  Short term goal 3: Pt will increase cervical AROM to 50 with flex and ext, 40 B SB, 50 B rotation  Long term goals  Time Frame for Long term goals : 4 wks  Long term goal 1: Pt will decrease pain by % in frequency or intensity  Long term goal 2: Pt will demonstrate full, painless cervical AROM  Long term goal 3: Pt will deny a functional limitation with sitting or standing due to pain  Long term goal 4: Pt will improve NDI score to 10% disability           Jacinto Maldonado PT

## 2021-10-21 NOTE — PROGRESS NOTES
Outpatient Physical Therapy  Phone: [phone] Fax: [phone]     To: SIRIA Blair       From: Gerry Gillis PT, PT     Patient: Essence Tinsley          : 1985  Diagnosis:  M54.2 neck pain       Date: 10/21/2021  Treatment Diagnosis:  Decreased cervical AROM, BUE N&T    Physical Therapy Certification/Re-Certification Form  Dear SIRIA Blair,  The following patient has been evaluated for physical therapy services and for therapy to continue, Medicare requires monthly physician review of the treatment plan. Please review the attached evaluation and/or summary of the patient's plan of care, and verify that you agree therapy should continue by signing the attached document and sending it back to our office. Plan of Care/Treatment to date:  [x] Therapeutic Exercise   [x] Modalities:  [] Therapeutic Activity     [] Ultrasound  [] Electrical Stimulation   [] Gait Training      [] Cervical Traction [] Lumbar Traction  [x] Neuromuscular Re-education  [x] Hot/Coldpack [] Iontophoresis    [x] Instruction in HEP      Other:  [x] Manual Therapy       []                        [] Aquatic Therapy       []                      ? Frequency/Duration:  # Days per week: [] 1 day # Weeks: [] 1 week [] 5 weeks      [x] 2 days? [] 2 weeks [] 6 weeks     [] 3 days   [] 3 weeks [] 7 weeks     [] 4 days   [x] 4 weeks [] 8 weeks    Rehab Potential: [] Excellent [x] Good [] Fair  [] Poor       Electronically signed by:  Gerry Gillis PT, PT      If you have any questions or concerns, please don't hesitate to call.   Thank you for your referral.    Physician Signature:________________________________Date:__________________  By signing above, therapists plan is approved by physician

## 2021-10-21 NOTE — FLOWSHEET NOTE
[address] [place] [address] and [facility] [address] [place]   Phone: [phone]  Fax [phone]    Physical Therapy Daily Treatment Note    Date:  10/21/2021    Patient Name:  Elinor Márquez    :  1985  MRN: [id number]  Restrictions/Precautions:    Medical/Treatment Diagnosis Information:  · Diagnosis: M54.2 neck pain  Insurance/Certification information:  PT Insurance Information: [employer]  Physician Information:  Referring Practitioner: Kwabena Luevano MD  Plan of care signed (Y/N):  Sent to PCP DION Raya  Visit# / total visits:       G-Code (if applicable):          NDI:  at eval      Time in:   10:29      Timed Treatment: 15 Total Treatment Time:  58  Time out: 11:21  ________________________________________________________________________________________    Pain Level:    310  SUBJECTIVE:  See eval    OBJECTIVE: relief in LUE sxs with cervical distraction    Exercise/Equipment Resistance/Repetitions Other comments     Foam roll protocol  nv                                                                                                                                       Other Therapeutic Activities:  eval completed, HEP issued and practiced ([id number] - prone T's, Y's, shld ext)      Manual Treatments:        Manual cervical distraction 2'x2  Suboccipital release 2'x2  CTJ lift nv  Thoracic hug nv  L first rib mob nv  Seated thoracic PA mobs with passive flex nv  Cervical opening mob nv    Modalities:      Test/Measurements:         ASSESSMENT:     See eval    Treatment/Activity Tolerance:   [x]Patient tolerated treatment well [] Patient limited by fatique  []Patient limited by pain [] Patient limited by other medical complications  [] Other:     Goals:    Short term goals  Time Frame for Short term goals: 2 wks  Short term goal 1: Pt will be ind and compliant with HEP  Short term goal 2: Pt will decrease pain by 50% in frequency or intensity  Short term goal 3: Pt will increase cervical AROM to 50 with flex and ext, 40 B SB, 50 B rotation     Long term goals  Time Frame for Long term goals : 4 wks  Long term goal 1: Pt will decrease pain by % in frequency or intensity  Long term goal 2: Pt will demonstrate full, painless cervical AROM  Long term goal 3: Pt will deny a functional limitation with sitting or standing due to pain  Long term goal 4: Pt will improve NDI score to 10% disability     Plan: [] Continue per plan of care [] Alter current plan (see comments)   [x] Plan of care initiated [] Hold pending MD visit [] Discharge      Plan for Next Session:      Re-Certification Due Date:         Signature:  Nik Hartmann, PT

## 2021-10-29 ENCOUNTER — HOSPITAL ENCOUNTER (OUTPATIENT)
Dept: PHYSICAL THERAPY | Age: 36
Setting detail: THERAPIES SERIES
Discharge: HOME | End: 2021-10-29
Payer: COMMERCIAL

## 2021-10-29 PROCEDURE — 97140 MANUAL THERAPY 1/> REGIONS: CPT

## 2021-10-29 PROCEDURE — 97110 THERAPEUTIC EXERCISES: CPT

## 2021-10-29 NOTE — FLOWSHEET NOTE
[facility], [facility], [address] [facility], [address] [place] [address]  Phone: [phone]  Fax [phone]    Physical Therapy Daily Treatment Note    Date:  10/29/2021    Patient Name:  Glendy Childers    :  1985  MRN: [id number]  Restrictions/Precautions:    Medical/Treatment Diagnosis Information:  · Diagnosis: M54.2 neck pain  Insurance/Certification information:  PT Insurance Information: [employer]  Physician Information:  Referring Practitioner: Joe Gaines MD  Plan of care signed (Y/N):  Sent to PCP SIRIA Nielsen)  Visit# / total visits:      G-Code (if applicable):          NDI:  at eval      Time in:  9:33      Timed Treatment: 40 Total Treatment Time:  40  Time out: 10:13  ________________________________________________________________________________________    Pain Level:    3/10  SUBJECTIVE:  Compliant with HEP, which gives her relief for a while. OBJECTIVE: cervical flexion AROM 36 degrees (25 at eval)    Exercise/Equipment Resistance/Repetitions Other comments     Foam roll protocol  Rest x3'  Alt shld flex x10 B  Ceiling reach x10 B  Horizontal ABD/ADD x10  Rest x1'      Cervical stabilization with BP cuff 20->22mmHg 10\"x10      T's, Y's, shld ext on TG 5\"x5 each L6                                                                                                                        Other Therapeutic Activities:      HEP: [id number] - prone T's, Y's, shld ext      Manual Treatments:    x24'    Cervical opening mob with L rotation  Seated thoracic PA mobs with passive flex   CTJ lift with cavitation  Thoracic hug with cavitation  Manual cervical distraction 2' holds  Suboccipital release 2' holds  L first rib mob        Modalities:      Test/Measurements:         ASSESSMENT:     Pt progressing as evidenced by increased AROM. Hypomobile thoracic spine.     Treatment/Activity Tolerance:   [x]Patient tolerated treatment well [] Patient limited by fatique  []Patient limited by pain [] Patient limited by other medical complications  [] Other:     Goals:    Short term goals  Time Frame for Short term goals: 2 wks  Short term goal 1: Pt will be ind and compliant with HEP  Short term goal 2: Pt will decrease pain by 50% in frequency or intensity  Short term goal 3: Pt will increase cervical AROM to 50 with flex and ext, 40 B SB, 50 B rotation     Long term goals  Time Frame for Long term goals : 4 wks  Long term goal 1: Pt will decrease pain by % in frequency or intensity  Long term goal 2: Pt will demonstrate full, painless cervical AROM  Long term goal 3: Pt will deny a functional limitation with sitting or standing due to pain  Long term goal 4: Pt will improve NDI score to 10% disability     Plan: [x] Continue per plan of care [] Alter current plan (see comments)   [] Plan of care initiated [] Hold pending MD visit [] Discharge      Plan for Next Session:      Re-Certification Due Date:         Signature:  Brit Baker PT

## 2021-11-01 ENCOUNTER — HOSPITAL ENCOUNTER (OUTPATIENT)
Dept: PHYSICAL THERAPY | Age: 36
Setting detail: THERAPIES SERIES
Discharge: HOME | End: 2021-11-01
Payer: COMMERCIAL

## 2021-11-01 PROCEDURE — 97110 THERAPEUTIC EXERCISES: CPT

## 2021-11-01 PROCEDURE — 97140 MANUAL THERAPY 1/> REGIONS: CPT

## 2021-11-01 NOTE — FLOWSHEET NOTE
Test/Measurements:         ASSESSMENT:     Improved LUE pain but decreased cervical flex AROM today. Prone ex on TG withheld today due to pain after LV. Will monitor.     Treatment/Activity Tolerance:   [x]Patient tolerated treatment well [] Patient limited by fatique  []Patient limited by pain [] Patient limited by other medical complications  [] Other:     Goals:    Short term goals  Time Frame for Short term goals: 2 wks  Short term goal 1: Pt will be ind and compliant with HEP  Short term goal 2: Pt will decrease pain by 50% in frequency or intensity  Short term goal 3: Pt will increase cervical AROM to 50 with flex and ext, 40 B SB, 50 B rotation     Long term goals  Time Frame for Long term goals : 4 wks  Long term goal 1: Pt will decrease pain by % in frequency or intensity  Long term goal 2: Pt will demonstrate full, painless cervical AROM  Long term goal 3: Pt will deny a functional limitation with sitting or standing due to pain  Long term goal 4: Pt will improve NDI score to 10% disability     Plan: [x] Continue per plan of care [] Alter current plan (see comments)   [] Plan of care initiated [] Hold pending MD visit [] Discharge      Plan for Next Session:      Re-Certification Due Date:         Signature:  Daisy Cowden, PT

## 2021-11-05 ENCOUNTER — HOSPITAL ENCOUNTER (OUTPATIENT)
Dept: PHYSICAL THERAPY | Age: 36
Setting detail: THERAPIES SERIES
Discharge: HOME | End: 2021-11-05
Payer: COMMERCIAL

## 2021-11-05 PROCEDURE — 97140 MANUAL THERAPY 1/> REGIONS: CPT

## 2021-11-05 PROCEDURE — 97110 THERAPEUTIC EXERCISES: CPT

## 2021-11-05 NOTE — FLOWSHEET NOTE
[facility], [facility], [address] [facility], [address] [place] [address] Dr  Phone: [phone]  Fax [phone]    Physical Therapy Daily Treatment Note    Date:  2021    Patient Name:  Sy Chung    :  1985  MRN: [id number]  Restrictions/Precautions:    Medical/Treatment Diagnosis Information:  · Diagnosis: M54.2 neck pain  Insurance/Certification information:  PT Insurance Information: [employer]  Physician Information:  Referring Practitioner: Kacey Hollingsworth MD  Plan of care signed (Y/N):  Sent to PCP CEDRICK Treadwell-CNP)  Visit# / total visits:      G-Code (if applicable):          NDI:  at eval      Time in:  8:49    Timed Treatment: 41 Total Treatment Time:  41  Time out: 9:30  ________________________________________________________________________________________    Pain Level:    4/10  SUBJECTIVE:  States her neck and shoulders are stiff today, possibly due to sleeping in a strange position. Better ceci of LV (no DOMS). States her LUE hurts less frequently and feels she is improving overall. Will be OOT next week. OBJECTIVE: cervical AROM 20 flex, 35 ext    Exercise/Equipment Resistance/Repetitions Other comments     Foam roll protocol  Rest x3'  Alt shld flex x10 B  Ceiling reach x10 B  Horizontal ABD/ADD x10  Rest x1'      Cervical stabilization with BP cuff 20->22mmHg 10\"x10      T's, Y's, shld ext on TG  L6                                                                                                                        Other Therapeutic Activities:      HEP: [id number] - prone T's, Y's, shld ext      Manual Treatments:    x27'    Cervical opening mob with L rotation  Seated thoracic PA mobs with passive flex   CTJ lift without cavitation  Thoracic hug without cavitation  Supine thoracic PA mob gr.  V with cavitation  Manual cervical distraction 2' holds  Suboccipital release 2' holds   B side glides  L first rib mob        Modalities:      Test/Measurements:         ASSESSMENT:     Improved LUE pain but decreased cervical flex AROM today. Will plan to progress ex nv.     Treatment/Activity Tolerance:   [x]Patient tolerated treatment well [] Patient limited by fatique  []Patient limited by pain [] Patient limited by other medical complications  [] Other:     Goals:    Short term goals  Time Frame for Short term goals: 2 wks  Short term goal 1: Pt will be ind and compliant with HEP  Short term goal 2: Pt will decrease pain by 50% in frequency or intensity  Short term goal 3: Pt will increase cervical AROM to 50 with flex and ext, 40 B SB, 50 B rotation     Long term goals  Time Frame for Long term goals : 4 wks  Long term goal 1: Pt will decrease pain by % in frequency or intensity  Long term goal 2: Pt will demonstrate full, painless cervical AROM  Long term goal 3: Pt will deny a functional limitation with sitting or standing due to pain  Long term goal 4: Pt will improve NDI score to 10% disability     Plan: [x] Continue per plan of care [] Alter current plan (see comments)   [] Plan of care initiated [] Hold pending MD visit [] Discharge      Plan for Next Session:      Re-Certification Due Date:         Signature:  Larey Ahumada, PT

## 2021-11-08 ENCOUNTER — APPOINTMENT (OUTPATIENT)
Dept: PHYSICAL THERAPY | Age: 36
End: 2021-11-08
Payer: COMMERCIAL

## 2021-11-11 ENCOUNTER — APPOINTMENT (OUTPATIENT)
Dept: PHYSICAL THERAPY | Age: 36
End: 2021-11-11
Payer: COMMERCIAL

## 2021-11-15 ENCOUNTER — HOSPITAL ENCOUNTER (OUTPATIENT)
Dept: PHYSICAL THERAPY | Age: 36
Setting detail: THERAPIES SERIES
Discharge: HOME | End: 2021-11-15
Payer: COMMERCIAL

## 2021-11-15 NOTE — PROGRESS NOTES
Physical Therapy  Cancellation/No-show Note  Patient Name:   Vero aHrrell  :  1985   Date:  11/15/2021  Cancels to date: 1  No-shows to date: 0    For today's appointment patient:  [x] Cancelled  [] Rescheduled appointment  [] No-show     Reason given by patient:  [] Patient ill  [] Conflicting appointment  [] No transportation    [] Conflict with work  [] No reason given  [x] Other:     Comments:      Electronically signed by:  Randol Boeck, PT

## 2021-11-18 ENCOUNTER — HOSPITAL ENCOUNTER (OUTPATIENT)
Dept: PHYSICAL THERAPY | Age: 36
Setting detail: THERAPIES SERIES
Discharge: HOME | End: 2021-11-18
Payer: COMMERCIAL

## 2021-11-18 PROCEDURE — 97140 MANUAL THERAPY 1/> REGIONS: CPT

## 2021-11-18 PROCEDURE — 97110 THERAPEUTIC EXERCISES: CPT

## 2021-11-18 NOTE — FLOWSHEET NOTE
[facility] and Therapy, [facility] [address], [address]   Phone: [phone]  Fax [phone]    Physical Therapy Daily Treatment Note    Date:  2021    Patient Name:  Hiral Jung    :  1985  MRN: [id number]  Restrictions/Precautions:    Medical/Treatment Diagnosis Information:  · Diagnosis: M54.2 neck pain  Insurance/Certification information:  PT Insurance Information: [employer]  Physician Information:  Referring Practitioner: Liliana Wise MD  Plan of care signed (Y/N):  Sent to PCP DION Hermosillo  Visit# / total visits:      G-Code (if applicable):          NDI:  at eval      Time in:  8:50    Timed Treatment: 38 Total Treatment Time:  38  Time out: 9:28  ________________________________________________________________________________________    Pain Level:    1-210  SUBJECTIVE:  Feeling \"better than expected\" after being OOT and camping. HEP has helped keep her pain manageable. OBJECTIVE: cervical AROM 30 flex, 35 ext at onset of session    Exercise/Equipment Resistance/Repetitions Other comments     Foam roll protocol  Rest x3'  Alt shld flex x12 B  Ceiling reach x12 B  Horizontal ABD/ADD x12  Rest x1'      Cervical stabilization with BP cuff 20->22mmHg 10\"x10      T's, Y's, shld ext on TG  L6                                                                                                                        Other Therapeutic Activities:      HEP: [id number] - prone T's, Y's, shld ext      Manual Treatments:    x24'     Cervical opening mob with L rotation  Seated thoracic PA mobs with passive thoracic ext  CTJ lift without cavitation  Thoracic hug without cavitation  Supine thoracic PA mob gr.  V without cavitation  Manual cervical distraction 2' holds  Suboccipital release 2' holds   B side glides  L first rib mob        Modalities:      Test/Measurements:         ASSESSMENT:     Cont to

## 2021-11-22 ENCOUNTER — APPOINTMENT (OUTPATIENT)
Dept: PHYSICAL THERAPY | Age: 36
End: 2021-11-22
Payer: COMMERCIAL

## 2021-11-29 ENCOUNTER — HOSPITAL ENCOUNTER (OUTPATIENT)
Dept: PHYSICAL THERAPY | Age: 36
Setting detail: THERAPIES SERIES
Discharge: HOME | End: 2021-11-29
Payer: COMMERCIAL

## 2021-11-29 PROCEDURE — 97140 MANUAL THERAPY 1/> REGIONS: CPT

## 2021-11-29 PROCEDURE — 97110 THERAPEUTIC EXERCISES: CPT

## 2021-11-29 NOTE — FLOWSHEET NOTE
[facility] 79. and Therapy, [facility], [address] [place] [address], [address] [place] [address]  Phone: [phone]  Fax [phone]    Physical Therapy Daily Treatment Note    Date:  2021    Patient Name:  Elinor Márquez    :  1985  MRN: [id number]  Restrictions/Precautions:    Medical/Treatment Diagnosis Information:  · Diagnosis: M54.2 neck pain  Insurance/Certification information:  PT Insurance Information: [employer]  Physician Information:  Referring Practitioner: Kwabena Luevano MD  Plan of care signed (Y/N):  Sent to PCP DION Raya  Visit# / total visits:      G-Code (if applicable):          NDI:  at eval      Time in:  11:45    Timed Treatment: 42 Total Treatment Time:  42  Time out: 12:27  ________________________________________________________________________________________    Pain Level:    0/10  SUBJECTIVE:  Continuing to improve. Bought a weight bench and plans to do her prone exercises on it. OBJECTIVE: cervical AROM 28 flex, 42 ext at onset of session    Exercise/Equipment Resistance/Repetitions Other comments     Foam roll protocol  Rest x3'  Alt shld flex x12 B  Ceiling reach x12 B  Horizontal ABD/ADD x12  Rest x1'      Cervical stabilization with BP cuff 20->22mmHg 10\"x10      T's, Y's, shld ext on TG  L6                                                                                                                        Other Therapeutic Activities:      HEP: [id number] - prone T's, Y's, shld ext      Manual Treatments:    x27'       Seated thoracic PA mobs with passive thoracic ext  CTJ lift without cavitation    Manual cervical distraction 2-3' holds   Suboccipital release 2' holds   B side glides  L first rib mob        Modalities:      Test/Measurements:         ASSESSMENT:     Cont to report decreasing pain levels. Flexion AROM improved since LV.     Treatment/Activity Tolerance:   [x]Patient tolerated treatment well [] Patient limited by fatique  []Patient limited by pain [] Patient limited by other medical complications  [] Other:     Goals:    Short term goals  Time Frame for Short term goals: 2 wks  Short term goal 1: Pt will be ind and compliant with HEP  Short term goal 2: Pt will decrease pain by 50% in frequency or intensity  Short term goal 3: Pt will increase cervical AROM to 50 with flex and ext, 40 B SB, 50 B rotation     Long term goals  Time Frame for Long term goals : 4 wks  Long term goal 1: Pt will decrease pain by % in frequency or intensity  Long term goal 2: Pt will demonstrate full, painless cervical AROM  Long term goal 3: Pt will deny a functional limitation with sitting or standing due to pain  Long term goal 4: Pt will improve NDI score to 10% disability     Plan: [x] Continue per plan of care [] Alter current plan (see comments)   [] Plan of care initiated [] Hold pending MD visit [] Discharge      Plan for Next Session:      Re-Certification Due Date:         Signature:  Robyn Maria, PT

## 2021-12-02 ENCOUNTER — HOSPITAL ENCOUNTER (OUTPATIENT)
Dept: PHYSICAL THERAPY | Age: 36
Setting detail: THERAPIES SERIES
Discharge: HOME | End: 2021-12-02
Payer: COMMERCIAL

## 2021-12-02 PROCEDURE — 97140 MANUAL THERAPY 1/> REGIONS: CPT

## 2021-12-02 PROCEDURE — 97110 THERAPEUTIC EXERCISES: CPT

## 2021-12-02 NOTE — FLOWSHEET NOTE
[facility], [facility], [address] [place] [address], [address] [place] [address]  Phone: [phone]  Fax [phone]    Physical Therapy Daily Treatment Note    Date:  2021    Patient Name:  Essence Cody    :  1985  MRN: [id number]  Restrictions/Precautions:    Medical/Treatment Diagnosis Information:  · Diagnosis: M54.2 neck pain  Insurance/Certification information:  PT Insurance Information: [employer]  Physician Information:  Referring Practitioner: Lanny Dominguez MD  Plan of care signed (Y/N):  Sent to PCP SIRIA Alcantara)  Visit# / total visits:      G-Code (if applicable):          NDI:  at [facility],  on       Time in:  8:49    Timed treatment: 41    Total treatment: 41  Time out: 9:30  ________________________________________________________________________________________    Pain Level:    0/10  SUBJECTIVE:  \"not too bad\", a little stiff. Pain has decreased by 40%. Voices better tolerance of sitting at her desk and standing before she gets stiff. Feels ready for discharge. Agreeable to have her chart left open  OBJECTIVE: cervical AROM 50 flex, 40 ext, SB 38 R 40 L, rotation 45 R 60 L    Exercise/Equipment Resistance/Repetitions Other comments     Foam roll protocol  Rest x3'  Alt shld flex x12 B  Ceiling reach x12 B  Horizontal ABD/ADD x12  Rest x1'      Cervical stabilization with BP cuff 20->22mmHg 10\"x10      T's, Y's, shld ext on TG  L6                                                                                                                        Other Therapeutic Activities:      HEP: [id number] - prone T's, Y's, shld ext      Manual Treatments:    x27'        Seated thoracic PA mobs with passive thoracic ext  CTJ lift without cavitation  Supine thoracic PA mob gr.  V with cavitation  Manual cervical distraction 2-3' holds   Suboccipital release 2' holds   B side glides  L first rib mob        Modalities: Test/Measurements:         ASSESSMENT:     Cont to report decreasing pain levels. Has progressed in terms of pain, AROM, and sit/ stand tolerance but hasn't met PT goals. Will hold chart open x30 days as discussed with pt.     Treatment/Activity Tolerance:   [x]Patient tolerated treatment well [] Patient limited by fatique  []Patient limited by pain [] Patient limited by other medical complications  [] Other:     Goals:    Short term goals  Time Frame for Short term goals: 2 wks  Short term goal 1: Pt will be ind and compliant with HEP - met  Short term goal 2: Pt will decrease pain by 50% in frequency or intensity - progressing  Short term goal 3: Pt will increase cervical AROM to 50 with flex and ext, 40 B SB, 50 B rotation -partially met     Long term goals  Time Frame for Long term goals : 4 wks  Long term goal 1: Pt will decrease pain by % in frequency or intensity  Long term goal 2: Pt will demonstrate full, painless cervical AROM  Long term goal 3: Pt will deny a functional limitation with sitting or standing due to pain - progressing  Long term goal 4: Pt will improve NDI score to 10% disability     Plan: [x] Continue per plan of care [] Alter current plan (see comments)   [] Plan of care initiated [] Hold pending MD visit [] Discharge      Plan for Next Session:       Re-Certification Due Date:         Signature:  Theresa Lozoya, PT

## 2021-12-14 ENCOUNTER — HOSPITAL ENCOUNTER (EMERGENCY)
Age: 36
Discharge: HOME | End: 2021-12-15
Attending: EMERGENCY MEDICINE
Payer: COMMERCIAL

## 2021-12-14 VITALS
HEIGHT: 64 IN | TEMPERATURE: 98.3 F | HEART RATE: 86 BPM | WEIGHT: 178 LBS | DIASTOLIC BLOOD PRESSURE: 83 MMHG | OXYGEN SATURATION: 98 % | SYSTOLIC BLOOD PRESSURE: 127 MMHG | BODY MASS INDEX: 30.39 KG/M2 | RESPIRATION RATE: 16 BRPM

## 2021-12-14 DIAGNOSIS — R19.7 BLOODY DIARRHEA: ICD-10-CM

## 2021-12-14 DIAGNOSIS — U07.1 COVID-19: Primary | ICD-10-CM

## 2021-12-14 DIAGNOSIS — R51.9 ACUTE NONINTRACTABLE HEADACHE, UNSPECIFIED HEADACHE TYPE: ICD-10-CM

## 2021-12-14 PROCEDURE — 84703 CHORIONIC GONADOTROPIN ASSAY: CPT

## 2021-12-14 PROCEDURE — 83735 ASSAY OF MAGNESIUM: CPT

## 2021-12-14 PROCEDURE — 80053 COMPREHEN METABOLIC PANEL: CPT

## 2021-12-14 PROCEDURE — 99283 EMERGENCY DEPT VISIT LOW MDM: CPT

## 2021-12-14 PROCEDURE — 85025 COMPLETE CBC W/AUTO DIFF WBC: CPT

## 2021-12-14 ASSESSMENT — ENCOUNTER SYMPTOMS
SHORTNESS OF BREATH: 0
COUGH: 0
RHINORRHEA: 1

## 2021-12-14 ASSESSMENT — PAIN SCALES - GENERAL: PAINLEVEL_OUTOF10: 3

## 2021-12-15 ENCOUNTER — APPOINTMENT (OUTPATIENT)
Dept: GENERAL RADIOLOGY | Age: 36
End: 2021-12-15
Payer: COMMERCIAL

## 2021-12-15 LAB
A/G RATIO: 1.4 (ref 1.1–2.2)
ALBUMIN SERPL-MCNC: 4.1 G/DL (ref 3.4–5)
ALP BLD-CCNC: 62 U/L (ref 40–129)
ALT SERPL-CCNC: 25 U/L (ref 10–40)
ANION GAP SERPL CALCULATED.3IONS-SCNC: 12 MMOL/L (ref 3–16)
AST SERPL-CCNC: 18 U/L (ref 15–37)
BASOPHILS ABSOLUTE: 0 K/UL (ref 0–0.2)
BASOPHILS RELATIVE PERCENT: 0.3 %
BILIRUB SERPL-MCNC: <0.2 MG/DL (ref 0–1)
BILIRUBIN URINE: NEGATIVE
BLOOD, URINE: NEGATIVE
BUN BLDV-MCNC: 13 MG/DL (ref 7–20)
CALCIUM SERPL-MCNC: 8.7 MG/DL (ref 8.3–10.6)
CHLORIDE BLD-SCNC: 102 MMOL/L (ref 99–110)
CLARITY: CLEAR
CO2: 20 MMOL/L (ref 21–32)
COLOR: YELLOW
CREAT SERPL-MCNC: 0.6 MG/DL (ref 0.6–1.1)
D DIMER: <200 NG/ML DDU (ref 0–229)
EKG ATRIAL RATE: 77 BPM
EKG DIAGNOSIS: NORMAL
EKG P AXIS: 1 DEGREES
EKG P-R INTERVAL: 128 MS
EKG Q-T INTERVAL: 404 MS
EKG QRS DURATION: 74 MS
EKG QTC CALCULATION (BAZETT): 457 MS
EKG R AXIS: 58 DEGREES
EKG T AXIS: 35 DEGREES
EKG VENTRICULAR RATE: 77 BPM
EOSINOPHILS ABSOLUTE: 0.1 K/UL (ref 0–0.6)
EOSINOPHILS RELATIVE PERCENT: 1.1 %
GFR AFRICAN AMERICAN: >60
GFR NON-AFRICAN AMERICAN: >60
GLUCOSE BLD-MCNC: 121 MG/DL (ref 70–99)
GLUCOSE URINE: NEGATIVE MG/DL
HCG QUALITATIVE: NEGATIVE
HCT VFR BLD CALC: 42.2 % (ref 36–48)
HEMOGLOBIN: 14.3 G/DL (ref 12–16)
KETONES, URINE: NEGATIVE MG/DL
LEUKOCYTE ESTERASE, URINE: NEGATIVE
LYMPHOCYTES ABSOLUTE: 1.7 K/UL (ref 1–5.1)
LYMPHOCYTES RELATIVE PERCENT: 34.4 %
MAGNESIUM: 1.9 MG/DL (ref 1.8–2.4)
MCH RBC QN AUTO: 31.2 PG (ref 26–34)
MCHC RBC AUTO-ENTMCNC: 33.8 G/DL (ref 31–36)
MCV RBC AUTO: 92.4 FL (ref 80–100)
MICROSCOPIC EXAMINATION: NORMAL
MONOCYTES ABSOLUTE: 0.4 K/UL (ref 0–1.3)
MONOCYTES RELATIVE PERCENT: 7.8 %
NEUTROPHILS ABSOLUTE: 2.9 K/UL (ref 1.7–7.7)
NEUTROPHILS RELATIVE PERCENT: 56.4 %
NITRITE, URINE: NEGATIVE
PDW BLD-RTO: 12.6 % (ref 12.4–15.4)
PH UA: 6.5 (ref 5–8)
PLATELET # BLD: 311 K/UL (ref 135–450)
PMV BLD AUTO: 6.6 FL (ref 5–10.5)
POTASSIUM REFLEX MAGNESIUM: 3.5 MMOL/L (ref 3.5–5.1)
PROTEIN UA: NEGATIVE MG/DL
RBC # BLD: 4.57 M/UL (ref 4–5.2)
SODIUM BLD-SCNC: 134 MMOL/L (ref 136–145)
SPECIFIC GRAVITY UA: 1.02 (ref 1–1.03)
TOTAL PROTEIN: 7 G/DL (ref 6.4–8.2)
TROPONIN: <0.01 NG/ML
URINE REFLEX TO CULTURE: NORMAL
URINE TYPE: NORMAL
UROBILINOGEN, URINE: 0.2 E.U./DL
WBC # BLD: 5.1 K/UL (ref 4–11)

## 2021-12-15 PROCEDURE — 71045 X-RAY EXAM CHEST 1 VIEW: CPT

## 2021-12-15 PROCEDURE — 93005 ELECTROCARDIOGRAM TRACING: CPT | Performed by: EMERGENCY MEDICINE

## 2021-12-15 PROCEDURE — 93010 ELECTROCARDIOGRAM REPORT: CPT | Performed by: INTERNAL MEDICINE

## 2021-12-15 PROCEDURE — 85379 FIBRIN DEGRADATION QUANT: CPT

## 2021-12-15 PROCEDURE — 6370000000 HC RX 637 (ALT 250 FOR IP): Performed by: EMERGENCY MEDICINE

## 2021-12-15 PROCEDURE — 84484 ASSAY OF TROPONIN QUANT: CPT

## 2021-12-15 PROCEDURE — 81003 URINALYSIS AUTO W/O SCOPE: CPT

## 2021-12-15 RX ORDER — BUTALBITAL, ACETAMINOPHEN AND CAFFEINE 50; 325; 40 MG/1; MG/1; MG/1
1 TABLET ORAL EVERY 4 HOURS PRN
Qty: 20 TABLET | Refills: 0 | Status: SHIPPED | OUTPATIENT
Start: 2021-12-15

## 2021-12-15 RX ORDER — BUTALBITAL, ACETAMINOPHEN AND CAFFEINE 50; 325; 40 MG/1; MG/1; MG/1
1 TABLET ORAL ONCE
Status: COMPLETED | OUTPATIENT
Start: 2021-12-15 | End: 2021-12-15

## 2021-12-15 RX ORDER — ACETAMINOPHEN 500 MG
500 TABLET ORAL 4 TIMES DAILY PRN
Qty: 30 TABLET | Refills: 0 | Status: SHIPPED | OUTPATIENT
Start: 2021-12-15 | End: 2021-12-15

## 2021-12-15 RX ORDER — FLUTICASONE PROPIONATE 50 MCG
2 SPRAY, SUSPENSION (ML) NASAL DAILY
Qty: 16 G | Refills: 0 | Status: SHIPPED | OUTPATIENT
Start: 2021-12-15

## 2021-12-15 RX ADMIN — BUTALBITAL, ACETAMINOPHEN, AND CAFFEINE 1 TABLET: 50; 325; 40 TABLET ORAL at 00:16

## 2021-12-15 ASSESSMENT — PAIN SCALES - GENERAL: PAINLEVEL_OUTOF10: 3

## 2021-12-15 NOTE — ED PROVIDER NOTES
[facility]  ED  EMERGENCY DEPARTMENT ENCOUNTER      Pt Name: Essence Sawant  MRN: [id number]  [place] 1985  Date of evaluation: 12/14/2021  Provider: Joe Gaines MD    CHIEF COMPLAINT       Chief Complaint   Patient presents with    Positive For Covid-19     loose stools states there is blood in stool, headache, ear pain, positive 2 days ago          HISTORY OF PRESENT ILLNESS   (Location/Symptom, Timing/Onset,Context/Setting, Quality, Duration, Modifying Factors, Severity)  Note limiting factors. Essence Tinsley is a 39 y.o. female who presents to the emergency department for bloody diarrhea this afternoon. Left shoulder, neck, ear, jaw and headache which started this evening. The diarrhea started this morning. [id number] positive diagnosed yesterday. Started having sxs Saturday. Patient states she has a history of migraines associated with a pituitary tumor. She usually takes Topamax. She also has a history of hemorrhoids. Usually she'll have bleeding that she will put in a suppository and that would help but she tried this and it did not today. Nursing notes were reviewed. REVIEW OF SYSTEMS    (2-9 systems for level 4, 10 or more for level 5)     Review of Systems   Constitutional: Positive for fatigue. HENT: Positive for congestion and rhinorrhea. Respiratory: Negative for cough and shortness of breath. Cardiovascular: Negative for chest pain. Musculoskeletal: Positive for arthralgias and myalgias.          PAST MEDICAL HISTORY     Past Medical History:   Diagnosis Date    Anxiety     Asthma     Ectopic pregnancy     Enlarged pituitary gland ([facility] [address] 01/2019    Heart abnormalities     heart palpitations in early pregnancy         SURGICALHISTORY       Past Surgical History:   Procedure Laterality Date    CHOLECYSTECTOMY  02/16/2017    LAPAROSCOPIC CHOLECYSTECTOMY WITH CHOLANGIOGRAMS    COLONOSCOPY  5/19/14    normal    COLONOSCOPY N/A 8/27/2019    COLONOSCOPY WITH BIOPSY performed by Iain Raymond DO at [facility] / BIOPSY SKIN LESION OF ARM Right 3/12/2019    EXCISION FOREARM LIPOMA TIMES TWO performed by Sahil Fisher MD at [address] [place] [address] Said Right 03/12/2019    EXCISION FOREARM LIPOMA TIMES TWO (Right Arm Lower)    WISDOM TOOTH EXTRACTION      3 yrs ago         CURRENT MEDICATIONS       Discharge Medication List as of 12/15/2021  2:16 AM      CONTINUE these medications which have NOT CHANGED    Details   metoprolol tartrate (LOPRESSOR) 25 MG tablet Take 12.5 mg by mouth 2 times daily Historical Med      MAGNESIUM-OXIDE 400 (241.3 Mg) MG TABS tablet 400 mg daily , DAWHistorical Med      omeprazole (PRILOSEC) 20 MG delayed release capsule Take 1 capsule by mouth every morning (before breakfast) for 14 days, Disp-14 capsule, R-0Normal      albuterol sulfate HFA (PROVENTIL HFA) 108 (90 Base) MCG/ACT inhaler Inhale 2 puffs into the lungs every 4 hours as needed for Wheezing, Disp-1 Inhaler, R-0Print      Probiotic Product (PRO-BIOTIC BLEND PO) Take by mouthHistorical Med      Topiramate (TOPAMAX PO) Take by mouthHistorical Med             ALLERGIES     Pcn [penicillins]    FAMILY HISTORY       Family History   Problem Relation Age of Onset    Diabetes Mother     High Cholesterol Mother     High Blood Pressure Mother     Heart Disease Mother     Cancer Maternal Aunt     Heart Disease Maternal Uncle     High Cholesterol Paternal Aunt     Heart Disease Paternal Aunt     High Cholesterol Paternal Uncle     Heart Disease Paternal Uncle     High Cholesterol Maternal Grandmother     Heart Disease Maternal Grandmother     High Cholesterol Maternal Grandfather     Heart Disease Maternal Grandfather           SOCIAL HISTORY       Social History     Socioeconomic History    Marital status:      Spouse name: Not on file    Number of children: Not on file    Years of education: Not on file    Highest education level: Not on file   Occupational History    Not on file   Tobacco Use    Smoking status: Current Every Day Smoker     Packs/day: 1.00     Years: 3.00     Pack years: 3.00     Types: Cigarettes    Smokeless tobacco: Never Used    Tobacco comment: Vapor    Vaping Use    Vaping Use: Never used   Substance and Sexual Activity    Alcohol use: No    Drug use: Not Currently     Types: Marijuana [place])     Comment: 10/13/2021- haven't used in awhile    Sexual activity: Yes     Partners: Male   Other Topics Concern    Not on file   Social History Narrative    Not on file     Social Determinants of Health     Financial Resource Strain:     Difficulty of Paying Living Expenses: Not on file   Food Insecurity:     Worried About Running Out of Food in the Last Year: Not on file    Sary of Food in the Last Year: Not on file   Transportation Needs:     Lack of Transportation (Medical): Not on file    Lack of Transportation (Non-Medical):  Not on file   Physical Activity:     Days of Exercise per Week: Not on file    Minutes of Exercise per Session: Not on file   Stress:     Feeling of Stress : Not on file   Social Connections:     Frequency of Communication with Friends and Family: Not on file    Frequency of Social Gatherings with Friends and Family: Not on file    Attends Anabaptist Services: Not on file    Active Member of [address] [place] [address] or Organizations: Not on file    Attends Club or Organization Meetings: Not on file    Marital Status: Not on file   Intimate Partner Violence:     Fear of Current or Ex-Partner: Not on file    Emotionally Abused: Not on file    Physically Abused: Not on file    Sexually Abused: Not on file   Housing Stability:     Unable to Pay for Housing in the Last Year: Not on file    Number of [place] in the Last Year: Not on file    Unstable Housing in the Last Year: Not on file       SCREENINGS             PHYSICAL EXAM    (up to 7 for level 4, 8 or more for level 5)     ED emergency physician with the below findings:        Interpretation per the Radiologist below, if available at the time of this note:    XR CHEST PORTABLE   Final Result   Minimal bibasilar ground-glass opacities which is more apparent and could   just represent mild atelectasis or early infiltrates. Recommend short-term   follow-up. ED BEDSIDE ULTRASOUND:   Performed by ED Physician - none    LABS:  Labs Reviewed   COMPREHENSIVE METABOLIC PANEL W/ REFLEX TO MG FOR LOW K - Abnormal; Notable for the following components:       Result Value    Sodium 134 (*)     CO2 20 (*)     Glucose 121 (*)     All other components within normal limits    Narrative:     Performed at:  [address],  [place], [employer]   Phone [phone]   CBC WITH AUTO DIFFERENTIAL    Narrative:     Performed at:  [address],  [place], [employer]   Phone [phone]   URINE RT REFLEX TO CULTURE    Narrative:     Performed at:  [address],  [place], [address]   Phone [phone]   HCG, SERUM, QUALITATIVE    Narrative:     Performed at:  [place] [address],  [place], [address] [employer]   Phone [phone]   D-DIMER, QUANTITATIVE    Narrative:     Performed at:  [place] [address],  [place], [address]   Phone [phone]   TROPONIN    Narrative:     Performed at:  [address],  [place], [address]   Phone [phone]   MAGNESIUM    Narrative:     Performed at:  [address],  [place], [address]   Phone [phone]       All other labs were within normal range or not returned as of this dictation.     EMERGENCY DEPARTMENT COURSE and DIFFERENTIAL DIAGNOSIS/MDM:   Vitals:    Vitals:    12/14/21 2318   BP: 127/83   Pulse: 86   Resp: 16   Temp: 98.3 °F (36.8 °C)   TempSrc: Oral   SpO2: 98%   Weight: 178 lb (80.7 kg)   Height: 5' 4\" (1.626 m)           ED Course as of 12/15/21 0443   Wed Dec 15, 2021   0121 Adult female who is known Covid positive who comes in with headache and diarrhea. She has a history of hemorrhoids and is having bloody diarrhea. She will notice bright red blood dripping into the toilet. Basic laboratory studies ordered. Patient is given Fioricet for her symptoms. Is unremarkable. [TD]      ED Course User Index  [TD] Niesha Msutafa MD     Laboratory studies are reviewed. The patient is not anemic. Her hemoglobin is normal. Her D-dimer is normal. I did order 1 because of her complaints of neck strain for thromboembolism in the setting of COVID-19 given the negative D-dimer I believe that this is less likely. She does have a history of migraine headaches. I did give her Fioricet and upon reassessment she states she has improvement of her symptoms. The patient is reassured that bleeding is likely the fact that she is having repeated diarrhea and she does have hemorrhoids. I recommend lubrication prior to defecation and also sitz bath's. For her headache I recommend she continue to take her Topamax as prescribed I will also give Fioricet for home. Follow-up in the outpatient setting is recommended. The patient expresses understanding and is agreeable to treatment plan. All of her concerns are addressed. CRITICAL CARE TIME   None       CONSULTS:  None    PROCEDURES:       Procedures    FINAL IMPRESSION      1. COVID-19    2. Acute nonintractable headache, unspecified headache type    3.  Bloody diarrhea          DISPOSITION/PLAN   DISPOSITION Decision To Discharge 12/15/2021 02:01:10 AM      PATIENT REFERREDTO:  CEDRICK German - [id number]  [phone]    Schedule an appointment as soon as possible for a visit       [facility]

## 2023-05-30 ENCOUNTER — OFFICE VISIT (OUTPATIENT)
Dept: PRIMARY CARE CLINIC | Age: 38
End: 2023-05-30
Payer: COMMERCIAL

## 2023-05-30 VITALS
WEIGHT: 201 LBS | DIASTOLIC BLOOD PRESSURE: 70 MMHG | TEMPERATURE: 98.3 F | HEART RATE: 97 BPM | SYSTOLIC BLOOD PRESSURE: 118 MMHG | BODY MASS INDEX: 34.5 KG/M2 | OXYGEN SATURATION: 97 %

## 2023-05-30 DIAGNOSIS — J02.9 PHARYNGITIS, UNSPECIFIED ETIOLOGY: ICD-10-CM

## 2023-05-30 DIAGNOSIS — J35.8 TONSILLITH: Primary | ICD-10-CM

## 2023-05-30 LAB — S PYO AG THROAT QL: NORMAL

## 2023-05-30 PROCEDURE — 87880 STREP A ASSAY W/OPTIC: CPT

## 2023-05-30 PROCEDURE — 99212 OFFICE O/P EST SF 10 MIN: CPT

## 2023-05-30 RX ORDER — DULOXETIN HYDROCHLORIDE 30 MG/1
30 CAPSULE, DELAYED RELEASE ORAL DAILY
COMMUNITY
Start: 2022-10-31

## 2023-05-30 ASSESSMENT — ENCOUNTER SYMPTOMS
GASTROINTESTINAL NEGATIVE: 1
SINUS PRESSURE: 0
RHINORRHEA: 0
SHORTNESS OF BREATH: 0
SINUS PAIN: 0
COUGH: 0
SORE THROAT: 1
TROUBLE SWALLOWING: 0

## 2024-02-12 NOTE — TELEPHONE ENCOUNTER
----- Message from Sanjana Yousif MD sent at 2/16/2021 11:21 AM EST -----  Let patient know their pregnancy test is negative. Thanks. Problem: At Risk for Falls  Goal: Patient does not fall  Outcome: Monitoring/Evaluating progress  Goal: Patient takes action to control fall-related risks  Outcome: Monitoring/Evaluating progress     Problem: At Risk for Injury Due to Fall  Goal: Patient does not fall  Outcome: Monitoring/Evaluating progress  Goal: Takes action to control condition specific risks  Outcome: Monitoring/Evaluating progress  Goal: Verbalizes understanding of fall-related injury personal risks  Description: Document education using the patient education activity  Outcome: Monitoring/Evaluating progress     Problem: Breathing Pattern Ineffective  Goal: Air exchange is effective, demonstrated by Sp02 sat of greater then or = 92% (or as ordered)  Outcome: Monitoring/Evaluating progress  Goal: Respiratory pattern is quiet and regular without report of SOB  Outcome: Monitoring/Evaluating progress  Goal: Breathing pattern demonstrates minimal apnea during sleep with appropriate use of airway pressure support devices  Outcome: Monitoring/Evaluating progress  Goal: Verbalizes/demonstrates effective breathing management strategies  Description: Document education using the patient education activity. Outcome: Monitoring/Evaluating progress  Goal: Minimize respiratory effort related to dyspnea/shortness of breath (Hospice)  Outcome: Monitoring/Evaluating progress     Problem: Pneumonia  Goal: S/S of acute pneumonia are resolved  Description: If acute pneumonia is present, monitor for resolution of fever, cough, secretions and other test values based on presentation.   Outcome: Monitoring/Evaluating progress  Goal: Verbalizes understanding of pneumonia, treatment, and ongoing prevention  Description: Document on Patient Education Activity  Outcome: Monitoring/Evaluating progress     Problem: VTE (Actual)  Goal: Patient maintains mobility and remains free from complications of VTE  Outcome: Monitoring/Evaluating progress     Problem: Dysphagia  Goal: # Exhibits no s/s of aspiration  Description: Symptoms of aspiration include coughing, choking, throat clearing, change in voice quality, and/or a decrease in oxygen saturation by more than 2% points from baseline after swallowing. Outcome: Monitoring/Evaluating progress  Goal: # Verbalizes understanding of dysphagia management  Description: Document education using the patient education activity.   Outcome: Monitoring/Evaluating progress     Problem: Impaired Physical Mobility  Goal: Functional status is maintained or returned to baseline during hospitalization  Outcome: Monitoring/Evaluating progress  Goal: Tolerates activity for discharge setting with no abnormal symptoms  Outcome: Monitoring/Evaluating progress

## 2024-02-13 ENCOUNTER — OFFICE VISIT (OUTPATIENT)
Dept: PRIMARY CARE CLINIC | Age: 39
End: 2024-02-13

## 2024-02-13 VITALS
HEART RATE: 98 BPM | SYSTOLIC BLOOD PRESSURE: 120 MMHG | TEMPERATURE: 98.1 F | BODY MASS INDEX: 36.73 KG/M2 | RESPIRATION RATE: 14 BRPM | WEIGHT: 214 LBS | DIASTOLIC BLOOD PRESSURE: 80 MMHG | OXYGEN SATURATION: 96 %

## 2024-02-13 DIAGNOSIS — J06.9 VIRAL URI WITH COUGH: Primary | ICD-10-CM

## 2024-02-13 DIAGNOSIS — J02.9 PHARYNGITIS, UNSPECIFIED ETIOLOGY: ICD-10-CM

## 2024-02-13 LAB — S PYO AG THROAT QL: NORMAL

## 2024-02-13 RX ORDER — FLUTICASONE PROPIONATE 50 MCG
1 SPRAY, SUSPENSION (ML) NASAL DAILY
Qty: 32 G | Refills: 1 | Status: SHIPPED | OUTPATIENT
Start: 2024-02-13

## 2024-02-13 NOTE — PROGRESS NOTES
[facility]  2024    Essence Roca (:  1985) is a 38 y.o. female, here for evaluation of the following medical concerns:    Chief Complaint   Patient presents with    Pharyngitis    Headache    Otalgia        ASSESSMENT/ PLAN  1. Viral URI with cough  - Pseudoephedrine-DM-GG 60- MG TABS; Take 1 tablet by mouth every 6 hours as needed (cough & congestion)  Dispense: 16 tablet; Refill: 0  - fluticasone (FLONASE) 50 MCG/ACT nasal spray; 1 spray by Each Nostril route daily  Dispense: 32 g; Refill: 1    2. Pharyngitis, unspecified etiology  - POCT rapid strep A       - We did a rapid COVID test in the office that was negative.  - Supportive care measures discussed.    Return if symptoms worsen or fail to improve.    HPI  I saw her son, Esvin on Friday and diagnosed him with strep.  Her symptoms started 1-2 days ago.  +sore throat  +HA  +sinus congestion  +otalgia  +slight cough; not productive.  Denies fever/chills.  Denies N/V/D. No belly pain. Appetite is slightly decreased.    Took some ibuprofen earlier today.     Denies any known sick contacts.    Planning on going out of town this weekend to visit family.      ROS  Review of Systems   Constitutional:  Positive for appetite change. Negative for chills, fatigue and fever.   HENT:  Positive for congestion, ear pain (\"pressure\"), sinus pressure and sore throat. Negative for ear discharge, postnasal drip, rhinorrhea, sinus pain and trouble swallowing.    Eyes:  Negative for redness.   Respiratory:  Positive for cough. Negative for chest tightness, shortness of breath and wheezing.    Cardiovascular:  Negative for chest pain, palpitations and leg swelling.   Gastrointestinal: Negative.    Musculoskeletal:  Negative for myalgias.   Skin:  Negative for rash.   Neurological:  Positive for headaches.   Hematological:  Negative for adenopathy.       HISTORIES  Current Outpatient Medications on File Prior to Visit   Medication

## 2024-04-17 ENCOUNTER — OFFICE VISIT (OUTPATIENT)
Dept: PRIMARY CARE CLINIC | Age: 39
End: 2024-04-17
Payer: COMMERCIAL

## 2024-04-17 VITALS
DIASTOLIC BLOOD PRESSURE: 78 MMHG | HEART RATE: 98 BPM | TEMPERATURE: 98.5 F | RESPIRATION RATE: 14 BRPM | WEIGHT: 213 LBS | OXYGEN SATURATION: 97 % | SYSTOLIC BLOOD PRESSURE: 120 MMHG | BODY MASS INDEX: 36.56 KG/M2

## 2024-04-17 DIAGNOSIS — J06.9 VIRAL URI WITH COUGH: Primary | ICD-10-CM

## 2024-04-17 PROCEDURE — 99213 OFFICE O/P EST LOW 20 MIN: CPT

## 2024-04-17 RX ORDER — BENZONATATE 200 MG/1
200 CAPSULE ORAL 3 TIMES DAILY PRN
Qty: 30 CAPSULE | Refills: 0 | Status: SHIPPED | OUTPATIENT
Start: 2024-04-17 | End: 2024-04-27

## 2024-04-17 ASSESSMENT — ENCOUNTER SYMPTOMS
SINUS PAIN: 0
SHORTNESS OF BREATH: 0
NAUSEA: 0
TROUBLE SWALLOWING: 0
EYE REDNESS: 0
CHEST TIGHTNESS: 1
VOMITING: 0
WHEEZING: 0
COUGH: 1
SINUS PRESSURE: 1
ABDOMINAL PAIN: 0
SORE THROAT: 1
DIARRHEA: 0

## 2024-04-17 NOTE — PROGRESS NOTES
[facility]  2024    Essence Roca (:  1985) is a 38 y.o. female, here for evaluation of the following medical concerns:    Chief Complaint   Patient presents with    Cough    Head Congestion     Moving into her chest        ASSESSMENT/ PLAN  1. Viral URI with cough  - Pseudoephedrine-DM-GG 60- MG TABS; Take 1 tablet by mouth every 6 hours as needed (cough & congestion)  Dispense: 16 tablet; Refill: 0  - benzonatate (TESSALON) 200 MG capsule; Take 1 capsule by mouth 3 times daily as needed for Cough  Dispense: 30 capsule; Refill: 0       - Supportive care measures discussed/reinforced.  - Expected timeline of viral illness discussed.    Return if symptoms worsen or fail to improve.    HPI  Here today with respiratory symptoms.  Symptoms started on , about 3 days ago.  Works on the phone a lot for work. Has been disruptive.  +cough; productive. Green, thick mucous. Come in fits. Bothersome at night.  +sinus congestion/drainage  +scratchy throat  Denies fever/chills.  Denies N/V/D. Appetite is slightly decreased.  Drinking fluids and urinating normally. Has been drinking a native american tea that helps with immune support, etc.    Taking a teaspoon of honey at night before bed.   She took some Mucinex last night.  Flonase nasal spray.    She is not a current smoker.  Has asthma listed in her history.    States that her  has similar symptoms.     Cleaned out their shed on Saturday.      ROS  Review of Systems   Constitutional:  Negative for chills, fatigue and fever.   HENT:  Positive for congestion, postnasal drip, sinus pressure and sore throat. Negative for ear discharge, ear pain, sinus pain and trouble swallowing.    Eyes:  Negative for redness.   Respiratory:  Positive for cough and chest tightness. Negative for shortness of breath and wheezing.    Cardiovascular:  Negative for chest pain, palpitations and leg swelling.   Gastrointestinal:  Negative

## 2024-06-12 ENCOUNTER — OFFICE VISIT (OUTPATIENT)
Dept: PRIMARY CARE CLINIC | Age: 39
End: 2024-06-12
Payer: COMMERCIAL

## 2024-06-12 VITALS
WEIGHT: 216 LBS | HEART RATE: 89 BPM | OXYGEN SATURATION: 98 % | DIASTOLIC BLOOD PRESSURE: 82 MMHG | HEIGHT: 64 IN | BODY MASS INDEX: 36.88 KG/M2 | RESPIRATION RATE: 14 BRPM | SYSTOLIC BLOOD PRESSURE: 128 MMHG | TEMPERATURE: 98 F

## 2024-06-12 DIAGNOSIS — D35.2 BENIGN NEOPLASM OF PITUITARY GLAND AND CRANIOPHARYNGEAL DUCT (POUCH) (HCC): ICD-10-CM

## 2024-06-12 DIAGNOSIS — F41.1 GENERALIZED ANXIETY DISORDER: ICD-10-CM

## 2024-06-12 DIAGNOSIS — E66.09 CLASS 2 OBESITY DUE TO EXCESS CALORIES WITHOUT SERIOUS COMORBIDITY WITH BODY MASS INDEX (BMI) OF 37.0 TO 37.9 IN ADULT: ICD-10-CM

## 2024-06-12 DIAGNOSIS — R06.81 WITNESSED APNEIC SPELLS: ICD-10-CM

## 2024-06-12 DIAGNOSIS — Z76.89 ENCOUNTER TO ESTABLISH CARE: Primary | ICD-10-CM

## 2024-06-12 DIAGNOSIS — D35.3 BENIGN NEOPLASM OF PITUITARY GLAND AND CRANIOPHARYNGEAL DUCT (POUCH) (HCC): ICD-10-CM

## 2024-06-12 PROBLEM — R07.2 PRECORDIAL PAIN: Status: RESOLVED | Noted: 2017-10-26 | Resolved: 2024-06-12

## 2024-06-12 PROBLEM — F41.9 ANXIETY DISORDER, UNSPECIFIED: Status: ACTIVE | Noted: 2024-06-12

## 2024-06-12 PROBLEM — R06.02 SOB (SHORTNESS OF BREATH): Status: RESOLVED | Noted: 2017-10-26 | Resolved: 2024-06-12

## 2024-06-12 PROBLEM — R00.2 PALPITATION: Status: RESOLVED | Noted: 2017-10-26 | Resolved: 2024-06-12

## 2024-06-12 PROCEDURE — 99213 OFFICE O/P EST LOW 20 MIN: CPT

## 2024-06-12 RX ORDER — DULOXETIN HYDROCHLORIDE 30 MG/1
30 CAPSULE, DELAYED RELEASE ORAL DAILY
Qty: 90 CAPSULE | Refills: 1 | Status: SHIPPED | OUTPATIENT
Start: 2024-06-12

## 2024-06-12 RX ORDER — LEVONORGESTREL 52 MG/1
1 INTRAUTERINE DEVICE INTRAUTERINE ONCE
COMMUNITY

## 2024-06-12 SDOH — ECONOMIC STABILITY: INCOME INSECURITY: HOW HARD IS IT FOR YOU TO PAY FOR THE VERY BASICS LIKE FOOD, HOUSING, MEDICAL CARE, AND HEATING?: NOT HARD AT ALL

## 2024-06-12 SDOH — ECONOMIC STABILITY: FOOD INSECURITY: WITHIN THE PAST 12 MONTHS, YOU WORRIED THAT YOUR FOOD WOULD RUN OUT BEFORE YOU GOT MONEY TO BUY MORE.: NEVER TRUE

## 2024-06-12 SDOH — ECONOMIC STABILITY: FOOD INSECURITY: WITHIN THE PAST 12 MONTHS, THE FOOD YOU BOUGHT JUST DIDN'T LAST AND YOU DIDN'T HAVE MONEY TO GET MORE.: NEVER TRUE

## 2024-06-12 SDOH — HEALTH STABILITY: PHYSICAL HEALTH: ON AVERAGE, HOW MANY MINUTES DO YOU ENGAGE IN EXERCISE AT THIS LEVEL?: 30 MIN

## 2024-06-12 SDOH — ECONOMIC STABILITY: HOUSING INSECURITY
IN THE LAST 12 MONTHS, WAS THERE A TIME WHEN YOU DID NOT HAVE A STEADY PLACE TO SLEEP OR SLEPT IN A SHELTER (INCLUDING NOW)?: NO

## 2024-06-12 ASSESSMENT — PATIENT HEALTH QUESTIONNAIRE - PHQ9
SUM OF ALL RESPONSES TO PHQ QUESTIONS 1-9: 0
1. LITTLE INTEREST OR PLEASURE IN DOING THINGS: NOT AT ALL
SUM OF ALL RESPONSES TO PHQ9 QUESTIONS 1 & 2: 0
2. FEELING DOWN, DEPRESSED OR HOPELESS: NOT AT ALL

## 2024-06-12 ASSESSMENT — ENCOUNTER SYMPTOMS
VOMITING: 0
NAUSEA: 0
APNEA: 1
COUGH: 0
SHORTNESS OF BREATH: 0

## 2024-06-12 NOTE — PROGRESS NOTES
started it thinking it would also help with possible fibromyalgia pain.  Has had ED visits in the past for panic attacks/thinking she was having a heart attack.   Uses a THC pen before bed. States that it's been of the only thing that helps her sleep.  Has never done formal therapy or counseling. Has been reading about CBT and incorporating aspects into her daily life.   helps to ground her.     +Concern for sleep apnea. States that multiple family members have it. Her  wanted her to mention it today.  Increased snoring and witnessed apneic episodes.   Symptoms have worsened since she gained weight after she stopped smoking.   Reports mild daytime fatigue. Has never fallen asleep while driving, etc.   Rare caffeine intake.    +Hx of Benign Neoplastic Pituitary Tumor.   Follows with [facility]. Has serial CT scans to monitor for changes. Stable.    Has multiple skin lipomas. Had one removed in 2019 with Dr. Rawls.     Denies any family history of breast cancer.  Maternal aunt and uncle both with history of colorectal cancer.         ROS  Review of Systems   Constitutional:  Negative for appetite change, chills, fatigue, fever and unexpected weight change.   HENT: Negative.     Respiratory:  Positive for apnea. Negative for cough and shortness of breath.    Cardiovascular:  Negative for chest pain, palpitations and leg swelling.   Gastrointestinal:  Negative for nausea and vomiting.   Endocrine: Negative for polydipsia, polyphagia and polyuria.   Genitourinary:  Negative for frequency.   Musculoskeletal:  Negative for myalgias.   Skin: Negative.    Allergic/Immunologic: Positive for environmental allergies.   Neurological:  Negative for dizziness and weakness.   Hematological:  Negative for adenopathy.   Psychiatric/Behavioral:  Positive for sleep disturbance. The patient is nervous/anxious.         HISTORIES  Current Outpatient Medications on File Prior to Visit   Medication Sig Dispense Refill

## 2024-06-25 ENCOUNTER — TELEPHONE (OUTPATIENT)
Dept: PULMONOLOGY | Age: 39
End: 2024-06-25

## 2024-06-25 ENCOUNTER — TELEMEDICINE (OUTPATIENT)
Dept: SLEEP MEDICINE | Age: 39
End: 2024-06-25

## 2024-06-25 DIAGNOSIS — Z72.821 POOR SLEEP HYGIENE: ICD-10-CM

## 2024-06-25 DIAGNOSIS — R06.83 SNORING: Primary | ICD-10-CM

## 2024-06-25 DIAGNOSIS — Z72.0 TOBACCO ABUSE: ICD-10-CM

## 2024-06-25 DIAGNOSIS — F51.04 PSYCHOPHYSIOLOGICAL INSOMNIA: ICD-10-CM

## 2024-06-25 DIAGNOSIS — G47.30 OBSERVED SLEEP APNEA: ICD-10-CM

## 2024-06-25 DIAGNOSIS — G47.10 HYPERSOMNIA: ICD-10-CM

## 2024-06-25 DIAGNOSIS — G25.81 RLS (RESTLESS LEGS SYNDROME): ICD-10-CM

## 2024-06-25 DIAGNOSIS — E66.9 OBESITY (BMI 30-39.9): ICD-10-CM

## 2024-06-25 DIAGNOSIS — F41.9 ANXIETY: ICD-10-CM

## 2024-06-25 ASSESSMENT — SLEEP AND FATIGUE QUESTIONNAIRES
ESS TOTAL SCORE: 11
HOW LIKELY ARE YOU TO NOD OFF OR FALL ASLEEP WHILE LYING DOWN TO REST IN THE AFTERNOON WHEN CIRCUMSTANCES PERMIT: MODERATE CHANCE OF DOZING
HOW LIKELY ARE YOU TO NOD OFF OR FALL ASLEEP WHEN YOU ARE A PASSENGER IN A CAR FOR AN HOUR WITHOUT A BREAK: MODERATE CHANCE OF DOZING
HOW LIKELY ARE YOU TO NOD OFF OR FALL ASLEEP WHILE SITTING INACTIVE IN A PUBLIC PLACE: SLIGHT CHANCE OF DOZING
HOW LIKELY ARE YOU TO NOD OFF OR FALL ASLEEP WHILE WATCHING TV: SLIGHT CHANCE OF DOZING
HOW LIKELY ARE YOU TO NOD OFF OR FALL ASLEEP IN A CAR, WHILE STOPPED FOR A FEW MINUTES IN TRAFFIC: SLIGHT CHANCE OF DOZING
HOW LIKELY ARE YOU TO NOD OFF OR FALL ASLEEP WHILE SITTING AND READING: MODERATE CHANCE OF DOZING
HOW LIKELY ARE YOU TO NOD OFF OR FALL ASLEEP WHILE SITTING AND TALKING TO SOMEONE: SLIGHT CHANCE OF DOZING
HOW LIKELY ARE YOU TO NOD OFF OR FALL ASLEEP WHILE SITTING QUIETLY AFTER LUNCH WITHOUT ALCOHOL: SLIGHT CHANCE OF DOZING

## 2024-06-25 NOTE — PROGRESS NOTES
Heart Disease Paternal Aunt     High Cholesterol Paternal Uncle     Heart Disease Paternal Uncle     High Cholesterol Maternal Grandmother     Heart Disease Maternal Grandmother     High Cholesterol Maternal Grandfather     Heart Disease Maternal Grandfather        Current Medications:    Current Outpatient Medications:     levonorgestrel (MIRENA, 52 MG,) IUD 52 mg, 1 each by IntraUTERine route once, Disp: , Rfl:     DULoxetine (CYMBALTA) 30 MG extended release capsule, Take 1 capsule by mouth daily, Disp: 90 capsule, Rfl: 1    fluticasone (FLONASE) 50 MCG/ACT nasal spray, 1 spray by Each Nostril route daily, Disp: 32 g, Rfl: 1    Review of Systems  Constitutional: Negative for fever  HENT: Negative for sore throat  Eyes: Negative for redness   Respiratory: Negative for dyspnea, cough  Cardiovascular: Negative for chest pain  Gastrointestinal: Negative for vomiting, diarrhea   Genitourinary: Negative for hematuria   Musculoskeletal: +arthralgias   Skin: Negative for rash  Neurological: Negative for syncope  Hematological: Negative for adenopathy  Psychiatric/Behavorial: Negative for anxiety      Objective:   PHYSICAL EXAM:  LMP 04/24/2024     Physical Exam  Exam:  Gen: No acute distress, does not appear to be in pain. Appears well developed and nourished.  HENT: Head is normocephalic and atraumatic. Normal appearing nose. External Ears normal.   Neck: No visualized mass. Trachea is midline   Eyes: EOM intact. No visible discharge.   Resp:No visualized signs of difficulty breathing or respiratory distress, speaking in full sentences.  Respiratory effort normal.  Neuro: Awake. Alert.  Able to follow commands.  No facial asymmetry.  Skin: No significant lesions or discoloration noted on facial skin    Musculoskeletal: Normal range of motion of the neck.  Psych: Oriented x 3. No anxiety. Normal affect.        DATA:   12/14/2021 Hgb 14.3  5/10/2022 ferritin 62    Assessment:       Snoring  Observed sleep apnea

## 2024-06-27 ENCOUNTER — TELEMEDICINE (OUTPATIENT)
Dept: PRIMARY CARE CLINIC | Age: 39
End: 2024-06-27
Payer: COMMERCIAL

## 2024-06-27 DIAGNOSIS — K64.9 BLEEDING HEMORRHOIDS: Primary | ICD-10-CM

## 2024-06-27 PROCEDURE — 99213 OFFICE O/P EST LOW 20 MIN: CPT

## 2024-06-27 RX ORDER — HYDROCORTISONE ACETATE 25 MG/1
25 SUPPOSITORY RECTAL 2 TIMES DAILY
Qty: 14 SUPPOSITORY | Refills: 0 | Status: SHIPPED | OUTPATIENT
Start: 2024-06-27 | End: 2024-06-28 | Stop reason: SDUPTHER

## 2024-06-27 RX ORDER — HYDROCORTISONE ACETATE 25 MG/1
25 SUPPOSITORY RECTAL 2 TIMES DAILY
Qty: 14 SUPPOSITORY | Refills: 0 | Status: SHIPPED | OUTPATIENT
Start: 2024-06-27 | End: 2024-06-27

## 2024-06-27 ASSESSMENT — ENCOUNTER SYMPTOMS
CONSTIPATION: 1
VOMITING: 0
ABDOMINAL PAIN: 0
COUGH: 0
NAUSEA: 0
ANAL BLEEDING: 1
SHORTNESS OF BREATH: 0

## 2024-06-27 NOTE — PROGRESS NOTES
Essence Roca, was evaluated through a synchronous (real-time) audio-video encounter. The patient (or guardian if applicable) is aware that this is a billable service, which includes applicable co-pays. This Virtual Visit was conducted with patient's (and/or legal guardian's) consent. Patient identification was verified, and a caregiver was present when appropriate.   The patient was located at Home: [address] [facility] [place] [address]  Provider was located at Facility (Appt Dept): [address] [place] [id number]  Confirm you are appropriately licensed, registered, or certified to deliver care in the state where the patient is located as indicated above. If you are not or unsure, please re-schedule the visit: Yes, I confirm.     Essence Roca (:  1985) is a Established patient, presenting virtually for evaluation of the following:    Assessment & Plan   Below is the assessment and plan developed based on review of pertinent history, physical exam, labs, studies, and medications.  1. Bleeding hemorrhoids  -     hydrocortisone (ANUSOL-HC) 25 MG suppository; Place 1 suppository rectally 2 times daily, Disp-14 suppository, R-0Normal    - Supportive care measures discussed. Continue Sitz baths. Off loading pressure, etc.  - Recommended daily stool softener, increasing fiber in her diet, lots of water, staying as active as she can tolerate.  - She will call [facility] to inquire about possible next steps. Will let me know if she needs a referral elsewhere for possible intervention.    Return if symptoms worsen or fail to improve.       Subjective   This has been an on going issue for her.   Most recent flare came on quick.   Did not have a BM the day before yesterday. Went to the bathroom yesterday, and symptoms got progressively worse over the course of the day. Was lying in bed last evening with an ice pack, went to the bathroom and it was \"bleeding profusely.\" Used gauze to help the

## 2024-06-28 DIAGNOSIS — K64.9 BLEEDING HEMORRHOIDS: ICD-10-CM

## 2024-06-28 RX ORDER — HYDROCORTISONE ACETATE 25 MG/1
25 SUPPOSITORY RECTAL 2 TIMES DAILY
Qty: 14 SUPPOSITORY | Refills: 0 | Status: SHIPPED | OUTPATIENT
Start: 2024-06-28

## 2024-07-07 ENCOUNTER — HOSPITAL ENCOUNTER (OUTPATIENT)
Dept: SLEEP CENTER | Age: 39
Discharge: HOME | End: 2024-07-09
Payer: COMMERCIAL

## 2024-07-07 DIAGNOSIS — G47.30 OBSERVED SLEEP APNEA: ICD-10-CM

## 2024-07-07 DIAGNOSIS — F41.9 ANXIETY: ICD-10-CM

## 2024-07-07 DIAGNOSIS — Z72.0 TOBACCO ABUSE: ICD-10-CM

## 2024-07-07 DIAGNOSIS — E66.9 OBESITY (BMI 30-39.9): ICD-10-CM

## 2024-07-07 DIAGNOSIS — F51.04 PSYCHOPHYSIOLOGICAL INSOMNIA: ICD-10-CM

## 2024-07-07 DIAGNOSIS — Z72.821 POOR SLEEP HYGIENE: ICD-10-CM

## 2024-07-07 DIAGNOSIS — G25.81 RLS (RESTLESS LEGS SYNDROME): ICD-10-CM

## 2024-07-07 DIAGNOSIS — R06.83 SNORING: ICD-10-CM

## 2024-07-07 DIAGNOSIS — G47.10 HYPERSOMNIA: ICD-10-CM

## 2024-07-07 PROCEDURE — 95806 SLEEP STUDY UNATT&RESP EFFT: CPT

## 2024-07-15 PROBLEM — R06.83 SNORING: Status: ACTIVE | Noted: 2024-07-15

## 2024-07-16 ENCOUNTER — TELEPHONE (OUTPATIENT)
Dept: PULMONOLOGY | Age: 39
End: 2024-07-16

## 2024-07-16 DIAGNOSIS — G47.33 OSA (OBSTRUCTIVE SLEEP APNEA): Primary | ICD-10-CM

## 2024-07-23 ENCOUNTER — TELEPHONE (OUTPATIENT)
Dept: PULMONOLOGY | Age: 39
End: 2024-07-23

## 2024-08-15 ENCOUNTER — TELEPHONE (OUTPATIENT)
Dept: PULMONOLOGY | Age: 39
End: 2024-08-15

## 2024-08-15 NOTE — TELEPHONE ENCOUNTER
L/M for patient to call back to r/s 31-90 appt on 8/20/24 at 9:40 am because the machine was not ordered until 7/29 and the patient has not received it yet. This appointment is too soon.

## 2024-08-19 ENCOUNTER — TELEPHONE (OUTPATIENT)
Dept: PULMONOLOGY | Age: 39
End: 2024-08-19

## 2024-08-19 NOTE — TELEPHONE ENCOUNTER
Need to check on status on Machine. Pt r/s 31-90 to October. She has not heard from [facility].

## 2024-09-05 NOTE — TELEPHONE ENCOUNTER
I called [facility] [phone] and spoke to Gregoria Fontenot archived the orders they left messages on 7/29, 8/1, 8/5 and 8/8   Patient never returned call    I left message for patient to call the office

## 2024-09-05 NOTE — TELEPHONE ENCOUNTER
Patient returned call and stated she never got a call from [facility]  I provided her the phone number [phone]  She is going to reach out to them to get set up    I moved her 31-90 to 11/8/2024

## 2024-11-08 ENCOUNTER — TELEPHONE (OUTPATIENT)
Dept: PULMONOLOGY | Age: 39
End: 2024-11-08

## 2024-11-08 ENCOUNTER — TELEMEDICINE (OUTPATIENT)
Dept: PULMONOLOGY | Age: 39
End: 2024-11-08
Payer: COMMERCIAL

## 2024-11-08 DIAGNOSIS — Z71.89 CPAP USE COUNSELING: ICD-10-CM

## 2024-11-08 DIAGNOSIS — E66.9 OBESITY (BMI 30-39.9): ICD-10-CM

## 2024-11-08 DIAGNOSIS — R53.83 OTHER FATIGUE: ICD-10-CM

## 2024-11-08 DIAGNOSIS — G47.33 SEVERE OBSTRUCTIVE SLEEP APNEA: Primary | ICD-10-CM

## 2024-11-08 PROCEDURE — 99213 OFFICE O/P EST LOW 20 MIN: CPT | Performed by: NURSE PRACTITIONER

## 2024-11-08 ASSESSMENT — SLEEP AND FATIGUE QUESTIONNAIRES
HOW LIKELY ARE YOU TO NOD OFF OR FALL ASLEEP WHILE SITTING INACTIVE IN A PUBLIC PLACE: WOULD NEVER DOZE
HOW LIKELY ARE YOU TO NOD OFF OR FALL ASLEEP WHILE WATCHING TV: MODERATE CHANCE OF DOZING
HOW LIKELY ARE YOU TO NOD OFF OR FALL ASLEEP WHILE SITTING QUIETLY AFTER LUNCH WITHOUT ALCOHOL: WOULD NEVER DOZE
HOW LIKELY ARE YOU TO NOD OFF OR FALL ASLEEP WHILE LYING DOWN TO REST IN THE AFTERNOON WHEN CIRCUMSTANCES PERMIT: SLIGHT CHANCE OF DOZING
ESS TOTAL SCORE: 6
HOW LIKELY ARE YOU TO NOD OFF OR FALL ASLEEP WHEN YOU ARE A PASSENGER IN A CAR FOR AN HOUR WITHOUT A BREAK: MODERATE CHANCE OF DOZING
HOW LIKELY ARE YOU TO NOD OFF OR FALL ASLEEP WHILE SITTING AND TALKING TO SOMEONE: WOULD NEVER DOZE
HOW LIKELY ARE YOU TO NOD OFF OR FALL ASLEEP IN A CAR, WHILE STOPPED FOR A FEW MINUTES IN TRAFFIC: WOULD NEVER DOZE
HOW LIKELY ARE YOU TO NOD OFF OR FALL ASLEEP WHILE SITTING AND READING: SLIGHT CHANCE OF DOZING

## 2024-11-08 NOTE — TELEPHONE ENCOUNTER
Had VV on 11/08/24. Pressure changed. Faxed pressure change and OV note to [facility] via [employer]. Scheduled 1 year Follow up.     Need download in 1 month.

## 2024-11-08 NOTE — PROGRESS NOTES
billed by time    --CEDRICK WARREN - CNP on 11/8/2024 at 8:49 AM    An electronic signature was used to authenticate this note.

## 2024-12-11 NOTE — TELEPHONE ENCOUNTER
CPAP download report from 11/9/2024 - 12/8/2024 on auto CPAP 10-18 cm H2O reviewed.  Compliance is good 97%.  AHI is good 0.5.

## 2025-02-20 ENCOUNTER — OFFICE VISIT (OUTPATIENT)
Dept: PRIMARY CARE CLINIC | Age: 40
End: 2025-02-20

## 2025-02-20 VITALS
DIASTOLIC BLOOD PRESSURE: 80 MMHG | WEIGHT: 231 LBS | BODY MASS INDEX: 39.65 KG/M2 | SYSTOLIC BLOOD PRESSURE: 132 MMHG | HEART RATE: 99 BPM | OXYGEN SATURATION: 98 % | TEMPERATURE: 98.8 F

## 2025-02-20 DIAGNOSIS — J10.1 INFLUENZA A: ICD-10-CM

## 2025-02-20 DIAGNOSIS — J02.9 PHARYNGITIS, UNSPECIFIED ETIOLOGY: ICD-10-CM

## 2025-02-20 DIAGNOSIS — J40 BRONCHITIS: Primary | ICD-10-CM

## 2025-02-20 DIAGNOSIS — R05.9 COUGH, UNSPECIFIED TYPE: ICD-10-CM

## 2025-02-20 DIAGNOSIS — R06.2 WHEEZING: ICD-10-CM

## 2025-02-20 DIAGNOSIS — H66.002 NON-RECURRENT ACUTE SUPPURATIVE OTITIS MEDIA OF LEFT EAR WITHOUT SPONTANEOUS RUPTURE OF TYMPANIC MEMBRANE: ICD-10-CM

## 2025-02-20 DIAGNOSIS — R53.83 OTHER FATIGUE: ICD-10-CM

## 2025-02-20 PROBLEM — F41.1 ANXIETY STATE: Status: ACTIVE | Noted: 2022-02-18

## 2025-02-20 PROBLEM — D49.7 PITUITARY TUMOR: Status: ACTIVE | Noted: 2022-01-04

## 2025-02-20 PROBLEM — H69.93 DYSFUNCTION OF BOTH EUSTACHIAN TUBES: Status: ACTIVE | Noted: 2025-02-20

## 2025-02-20 PROBLEM — R20.0 LEFT ARM NUMBNESS: Status: RESOLVED | Noted: 2025-02-20 | Resolved: 2025-02-20

## 2025-02-20 PROBLEM — J30.2 SEASONAL ALLERGIES: Status: ACTIVE | Noted: 2022-02-18

## 2025-02-20 PROBLEM — M79.9 SOFT TISSUE DISORDER, UNSPECIFIED: Status: ACTIVE | Noted: 2025-02-20

## 2025-02-20 PROBLEM — G43.109 OCULAR MIGRAINE: Status: ACTIVE | Noted: 2022-01-15

## 2025-02-20 PROBLEM — Z97.5 USES HORMONE RELEASING INTRAUTERINE DEVICE (IUD) FOR CONTRACEPTION: Status: ACTIVE | Noted: 2022-04-22

## 2025-02-20 PROBLEM — R51.9 OCCIPITAL HEADACHE: Status: ACTIVE | Noted: 2022-01-04

## 2025-02-20 PROBLEM — J35.8 CRYPTIC TONSIL: Status: ACTIVE | Noted: 2025-02-20

## 2025-02-20 PROBLEM — F17.200 NICOTINE DEPENDENCE, UNSPECIFIED, UNCOMPLICATED: Status: ACTIVE | Noted: 2025-02-20

## 2025-02-20 PROBLEM — E78.5 DYSLIPIDEMIA: Status: ACTIVE | Noted: 2022-02-18

## 2025-02-20 PROBLEM — J32.9 CHRONIC SINUSITIS: Status: RESOLVED | Noted: 2025-02-20 | Resolved: 2025-02-20

## 2025-02-20 PROBLEM — K21.00 GASTROESOPHAGEAL REFLUX DISEASE WITH ESOPHAGITIS WITHOUT HEMORRHAGE: Status: ACTIVE | Noted: 2022-01-15

## 2025-02-20 PROBLEM — R10.13 EPIGASTRIC PAIN: Status: RESOLVED | Noted: 2025-02-20 | Resolved: 2025-02-20

## 2025-02-20 PROBLEM — E55.9 VITAMIN D DEFICIENCY: Status: ACTIVE | Noted: 2022-02-18

## 2025-02-20 PROBLEM — H69.90 DYSFUNCTION OF EUSTACHIAN TUBE: Status: RESOLVED | Noted: 2025-02-20 | Resolved: 2025-02-20

## 2025-02-20 PROBLEM — D47.3 ESSENTIAL (HEMORRHAGIC) THROMBOCYTHEMIA (HCC): Status: ACTIVE | Noted: 2025-02-20

## 2025-02-20 PROBLEM — M54.2 CERVICALGIA: Status: ACTIVE | Noted: 2022-02-18

## 2025-02-20 PROBLEM — K76.0 FATTY (CHANGE OF) LIVER, NOT ELSEWHERE CLASSIFIED: Status: ACTIVE | Noted: 2025-02-20

## 2025-02-20 LAB
INFLUENZA A ANTIGEN, POC: POSITIVE
INFLUENZA B ANTIGEN, POC: NEGATIVE
S PYO AG THROAT QL: NORMAL

## 2025-02-20 RX ORDER — ALBUTEROL SULFATE 90 UG/1
2 INHALANT RESPIRATORY (INHALATION) 4 TIMES DAILY PRN
Qty: 18 G | Refills: 0 | Status: SHIPPED | OUTPATIENT
Start: 2025-02-20

## 2025-02-20 RX ORDER — AZITHROMYCIN 250 MG/1
TABLET, FILM COATED ORAL
Qty: 6 TABLET | Refills: 0 | Status: SHIPPED | OUTPATIENT
Start: 2025-02-20 | End: 2025-03-02

## 2025-02-20 RX ORDER — PREDNISONE 20 MG/1
20 TABLET ORAL 2 TIMES DAILY
Qty: 10 TABLET | Refills: 0 | Status: SHIPPED | OUTPATIENT
Start: 2025-02-20 | End: 2025-02-25

## 2025-02-20 RX ORDER — OSELTAMIVIR PHOSPHATE 75 MG/1
75 CAPSULE ORAL 2 TIMES DAILY
Status: CANCELLED | OUTPATIENT
Start: 2025-02-20 | End: 2025-02-25

## 2025-02-20 SDOH — ECONOMIC STABILITY: FOOD INSECURITY: WITHIN THE PAST 12 MONTHS, YOU WORRIED THAT YOUR FOOD WOULD RUN OUT BEFORE YOU GOT MONEY TO BUY MORE.: NEVER TRUE

## 2025-02-20 SDOH — ECONOMIC STABILITY: FOOD INSECURITY: WITHIN THE PAST 12 MONTHS, THE FOOD YOU BOUGHT JUST DIDN'T LAST AND YOU DIDN'T HAVE MONEY TO GET MORE.: NEVER TRUE

## 2025-02-20 ASSESSMENT — ENCOUNTER SYMPTOMS
ALLERGIC/IMMUNOLOGIC NEGATIVE: 1
RHINORRHEA: 0
CHEST TIGHTNESS: 0
SORE THROAT: 1
EYES NEGATIVE: 1
TROUBLE SWALLOWING: 0
CONSTIPATION: 0
ABDOMINAL DISTENTION: 0
SINUS PRESSURE: 0
ABDOMINAL PAIN: 0
DIARRHEA: 0
WHEEZING: 1
STRIDOR: 0
VOMITING: 0
COUGH: 0
SHORTNESS OF BREATH: 0
NAUSEA: 0

## 2025-02-20 ASSESSMENT — PATIENT HEALTH QUESTIONNAIRE - PHQ9
2. FEELING DOWN, DEPRESSED OR HOPELESS: NOT AT ALL
SUM OF ALL RESPONSES TO PHQ QUESTIONS 1-9: 0
1. LITTLE INTEREST OR PLEASURE IN DOING THINGS: NOT AT ALL
SUM OF ALL RESPONSES TO PHQ QUESTIONS 1-9: 0
SUM OF ALL RESPONSES TO PHQ9 QUESTIONS 1 & 2: 0
SUM OF ALL RESPONSES TO PHQ QUESTIONS 1-9: 0
SUM OF ALL RESPONSES TO PHQ QUESTIONS 1-9: 0

## 2025-02-20 NOTE — PATIENT INSTRUCTIONS
Drink plenty of fluids.  RTO if symptoms do not improve or worsen. If after hours and concern is life threatening please go to nearest emergency room.    Annual exam due 6/24/25-please schedule this at your convenience

## 2025-02-20 NOTE — PROGRESS NOTES
[facility]  2025    Essence Roca (:  1985) is a 39 y.o. female, here for evaluation of the following medical concerns:    Chief Complaint   Patient presents with    Ear Pain    Pharyngitis    Shortness of Breath     When active    Dizziness        ASSESSMENT/ PLAN  Assessment & Plan  Bronchitis   Acute condition, new, Supportive care with appropriate antipyretics and fluids.  Educational material distributed and questions answered.    Orders:    predniSONE (DELTASONE) 20 MG tablet; Take 1 tablet by mouth 2 times daily for 5 days    Non-recurrent acute suppurative otitis media of left ear without spontaneous rupture of tympanic membrane   Acute condition, new, Supportive care with appropriate antipyretics and fluids.  Educational material distributed and questions answered.    Orders:    azithromycin (ZITHROMAX) 250 MG tablet; 500mg on day 1 followed by 250mg on days 2 - 5    Influenza A   Acute condition, new, Supportive care with appropriate antipyretics and fluids.  Educational material distributed and questions answered.  Declines Tamiflu. Influenza A +  Orders:    POCT Influenza A/B Antigen    Wheezing   Acute condition, new, Supportive care with appropriate antipyretics and fluids.    Orders:    albuterol sulfate HFA (VENTOLIN HFA) 108 (90 Base) MCG/ACT inhaler; Inhale 2 puffs into the lungs 4 times daily as needed for Wheezing    Other fatigue   Acute condition, new, Supportive care with appropriate antipyretics and fluids.  Strep -  Orders:    POCT rapid strep A    POCT Influenza A/B Antigen    Cough, unspecified type   Acute condition, new, Supportive care with appropriate antipyretics and fluids.    Orders:    POCT rapid strep A    POCT Influenza A/B Antigen    Pharyngitis, unspecified etiology   Acute condition, new, Supportive care with appropriate antipyretics and fluids.    Orders:    POCT rapid strep A    POCT Influenza A/B Antigen         Return in about 4

## 2025-02-21 DIAGNOSIS — F41.1 GENERALIZED ANXIETY DISORDER: ICD-10-CM

## 2025-02-21 RX ORDER — DULOXETIN HYDROCHLORIDE 30 MG/1
30 CAPSULE, DELAYED RELEASE ORAL DAILY
Qty: 90 CAPSULE | Refills: 1 | Status: SHIPPED | OUTPATIENT
Start: 2025-02-21

## 2025-08-27 ENCOUNTER — TELEMEDICINE ON DEMAND (OUTPATIENT)
Age: 40
End: 2025-08-27

## 2025-08-27 ENCOUNTER — E-VISIT (OUTPATIENT)
Dept: FAMILY MEDICINE CLINIC | Age: 40
End: 2025-08-27

## 2025-08-27 DIAGNOSIS — U07.1 POSITIVE SELF-ADMINISTERED ANTIGEN TEST FOR COVID-19: ICD-10-CM

## 2025-08-27 DIAGNOSIS — U07.1 COVID-19: Primary | ICD-10-CM

## 2025-08-27 DIAGNOSIS — R05.1 ACUTE COUGH: Primary | ICD-10-CM

## 2025-08-27 DIAGNOSIS — R50.81 FEVER DUE TO COVID-19: ICD-10-CM

## 2025-08-27 DIAGNOSIS — R09.81 NASAL CONGESTION: ICD-10-CM

## 2025-08-27 DIAGNOSIS — U07.1 FEVER DUE TO COVID-19: ICD-10-CM

## 2025-08-27 PROCEDURE — 99421 OL DIG E/M SVC 5-10 MIN: CPT

## 2025-08-27 PROCEDURE — 99213 OFFICE O/P EST LOW 20 MIN: CPT | Performed by: NURSE PRACTITIONER

## 2025-08-27 ASSESSMENT — LIFESTYLE VARIABLES
SMOKING_YEARS: 15
PACKS_PER_DAY: 1
SMOKING_STATUS: NO, BUT I USED TO SMOKE

## 2025-08-27 ASSESSMENT — ENCOUNTER SYMPTOMS: COUGH: 1

## (undated) DEVICE — SUTURE VCRL SZ 3-0 L18IN ABSRB UD L26MM SH 1/2 CIR J864D

## (undated) DEVICE — APPLICATOR PREP 26ML 0.7% IOD POVACRYLEX 74% ISO ALC ST

## (undated) DEVICE — YANKAUER,BULB TIP,W/O VENT,RIGID,STERILE: Brand: MEDLINE

## (undated) DEVICE — ELECTRODE ECG MONITR FOAM TEAR DROP ADLT RED

## (undated) DEVICE — TUBING, SUCTION, 3/16" X 10', STRAIGHT: Brand: MEDLINE

## (undated) DEVICE — ENDO CARRY-ON PROCEDURE KIT INCLUDES SUCTION TUBING, LUBRICANT, GAUZE, BIOHAZARD STICKER, TRANSPORT PAD AND INTERCEPT BEDSIDE KIT.: Brand: ENDO CARRY-ON PROCEDURE KIT

## (undated) DEVICE — GLOVE ORANGE PI 7 1/2   MSG9075

## (undated) DEVICE — NEEDLE HYPO 25GA L1.5IN BLU POLYPR HUB S STL REG BVL STR

## (undated) DEVICE — 3M™ STERI-STRIP™ COMPOUND BENZOIN TINCTURE 40 BAGS/CARTON 4 CARTONS/CASE C1544: Brand: 3M™ STERI-STRIP™

## (undated) DEVICE — PACK,UNIVERSAL,SPLIT,II,AURORA: Brand: MEDLINE

## (undated) DEVICE — STANDARD HYPODERMIC NEEDLE,POLYPROPYLENE HUB: Brand: MONOJECT

## (undated) DEVICE — GOWN SIRUS NONREIN XL W/TWL: Brand: MEDLINE INDUSTRIES, INC.

## (undated) DEVICE — ELECTRODE PT RET AD L9FT HI MOIST COND ADH HYDRGEL CORDED

## (undated) DEVICE — SYRINGE MED 10ML LUERLOCK TIP W/O SFTY DISP

## (undated) DEVICE — GAUZE,SPONGE,4"X4",8PLY,STRL,LF,10/TRAY: Brand: MEDLINE

## (undated) DEVICE — 3M™ STERI-STRIP™ REINFORCED ADHESIVE SKIN CLOSURES, R1540, 1/8 IN X 3 IN (3 MM X 75 MM), 5 STRIPS/ENVELOPE: Brand: 3M™ STERI-STRIP™

## (undated) DEVICE — SUTURE VCRL SZ 4-0 L18IN ABSRB UD L19MM PS-2 3/8 CIR PRIM J496H

## (undated) DEVICE — SOLUTION IV IRRIG 500ML 0.9% SODIUM CHL 2F7123

## (undated) DEVICE — TIP SUCT DIA12FR W STYL CTRL VENT DISPOSABLE FRAZ

## (undated) DEVICE — MAJOR SET UP PK

## (undated) DEVICE — CANNULA NSL 13FT TUBE AD ETCO2 DIV SAMP M

## (undated) DEVICE — SUTURE ETHLN SZ 3-0 L30IN NONABSORBABLE BLK FSL L30MM 3/8 1671H

## (undated) DEVICE — FORCEPS BX L240CM DIA2.4MM L NDL RAD JAW 4 133340

## (undated) DEVICE — 3M™ TEGADERM™ TRANSPARENT FILM DRESSING FRAME STYLE, 1626W, 4 IN X 4-3/4 IN (10 CM X 12 CM), 50/CT 4CT/CASE: Brand: 3M™ TEGADERM™